# Patient Record
Sex: MALE | Race: WHITE | NOT HISPANIC OR LATINO | Employment: FULL TIME | URBAN - NONMETROPOLITAN AREA
[De-identification: names, ages, dates, MRNs, and addresses within clinical notes are randomized per-mention and may not be internally consistent; named-entity substitution may affect disease eponyms.]

---

## 2017-01-19 ENCOUNTER — TELEPHONE (OUTPATIENT)
Dept: CARDIOLOGY | Facility: CLINIC | Age: 67
End: 2017-01-19

## 2017-01-19 NOTE — TELEPHONE ENCOUNTER
----- Message from Shara Laurent sent at 1/19/2017  4:08 PM EST -----  PATIENT HAS BEEN MOVED TO BE SEEN BY DR. NARAYAN IN THE [facility] BUT WONDERED WHEN THE DATE AND TIME OF HIS NEXT PACEMAKER CHECK IS SCHEDULED.      Returned call to patient, left message I was returning call.

## 2017-01-24 ENCOUNTER — TELEPHONE (OUTPATIENT)
Dept: CARDIOLOGY | Facility: CLINIC | Age: 67
End: 2017-01-24

## 2017-01-24 NOTE — TELEPHONE ENCOUNTER
"----- Message from Shara Laurent sent at 1/19/2017  4:08 PM EST -----  PATIENT HAS BEEN MOVED TO BE SEEN BY DR. NARAYAN IN THE [facility] BUT WONDERED WHEN THE DATE AND TIME OF HIS NEXT PACEMAKER CHECK IS SCHEDULED.      Called patient home and spoke with spouse, \"Beatris\" on 01/19/2017 and gave an interrogation of pacer date of 02/09/2017 @ 10:10a.m.  "

## 2017-02-09 ENCOUNTER — CLINICAL SUPPORT (OUTPATIENT)
Dept: CARDIOLOGY | Facility: CLINIC | Age: 67
End: 2017-02-09

## 2017-02-09 DIAGNOSIS — I49.5 SSS (SICK SINUS SYNDROME) (HCC): Primary | ICD-10-CM

## 2017-02-09 PROCEDURE — 93288 INTERROG EVL PM/LDLS PM IP: CPT | Performed by: INTERNAL MEDICINE

## 2017-02-23 ENCOUNTER — OFFICE VISIT (OUTPATIENT)
Dept: CARDIOLOGY | Facility: CLINIC | Age: 67
End: 2017-02-23

## 2017-02-23 VITALS
BODY MASS INDEX: 25.55 KG/M2 | RESPIRATION RATE: 14 BRPM | SYSTOLIC BLOOD PRESSURE: 116 MMHG | DIASTOLIC BLOOD PRESSURE: 71 MMHG | WEIGHT: 173 LBS | HEART RATE: 64 BPM

## 2017-02-23 DIAGNOSIS — R00.0 TACHYCARDIA: ICD-10-CM

## 2017-02-23 DIAGNOSIS — I49.5 SSS (SICK SINUS SYNDROME) (HCC): Primary | ICD-10-CM

## 2017-02-23 DIAGNOSIS — E78.5 DYSLIPIDEMIA: ICD-10-CM

## 2017-02-23 PROCEDURE — 99214 OFFICE O/P EST MOD 30 MIN: CPT | Performed by: PHYSICIAN ASSISTANT

## 2017-02-23 PROCEDURE — 93000 ELECTROCARDIOGRAM COMPLETE: CPT | Performed by: PHYSICIAN ASSISTANT

## 2017-02-23 RX ORDER — PRAVASTATIN SODIUM 40 MG
40 TABLET ORAL DAILY
Qty: 90 TABLET | Refills: 1 | Status: SHIPPED | OUTPATIENT
Start: 2017-02-23 | End: 2017-06-12 | Stop reason: SDUPTHER

## 2017-02-23 NOTE — PROGRESS NOTES
No Known Provider  Marvin Mendoza  1950 02/23/2017    Patient Active Problem List   Diagnosis   • SSS (sick sinus syndrome), s/p pacemaker placement 8/2009.   • Dyslipidemia       Dear No Known Provider:    Chief Complaint   Patient presents with   • Follow-up   • Rapid Heart Rate       Subjective     Marvin Mendoza is a 66 y.o. male with a past medical history significant for sick sinus syndrome status post permanent pacemaker implantation originally in August 2009 with a [employer] medical device.  He had a generator change on 3/17/2015.  He presents to the office today for follow-up visit.  He recently had his pacemaker interrogated which revealed 10.3 years of life left.  It also revealed high ventricular rates on multiple multiple occasions with no ventricular tachycardia or ventricular fibrillation appreciated. The patient denies any palpitations, dizziness, or near syncope. He cannot tell when his heart rate is up. He has been doing well. He denies any chest pains or shortness of breath. He does occasionally have some left shoulder pains. He works at [employer] and does a lot of heavy lifting. Denies any shoulder pain with exertion, while working, walking up stairs, or fast walking. Denies any easy fatigability. He states he is physically able to do the things he wants without limitation. No recent fever or chills. Overall he is doing well.     He has no known history of coronary artery disease, family history of premature coronary disease, and only smoked briefly as a teen. No diabetes or hypertension.       Current Outpatient Prescriptions:   •  aspirin 81 MG EC tablet, Take 81 mg by mouth daily., Disp: , Rfl:   •  pravastatin (PRAVACHOL) 40 MG tablet, Take 1 tablet by mouth Daily., Disp: 90 tablet, Rfl: 1  •  sildenafil (VIAGRA) 100 MG tablet, Take 100 mg by mouth as needed for erectile dysfunction., Disp: , Rfl:   •  metoprolol tartrate (LOPRESSOR) 25 MG tablet, Take 0.5 tablets by mouth 2 (Two)  Times a Day., Disp: 30 tablet, Rfl: 2    The following portions of the patient's history were reviewed and updated as appropriate: allergies, current medications, past family history, past medical history, past social history, past surgical history and problem list.    ROS  Review of Systems   Constitution: Negative for chills and fever.   Cardiovascular: Negative for leg swelling, near-syncope, orthopnea and syncope. Chest pain: Denies.  Respiratory: Negative for shortness of breath.   Hematologic/Lymphatic: Negative for bleeding problem.   Neurological: Dizziness: every now and then.     Objective   Blood pressure 116/71, pulse 64, resp. rate 14, weight 173 lb (78.5 kg).     Physical Exam   Constitutional: He is oriented to person, place, and time. He appears well-developed and well-nourished. No distress.   HENT:   Head: Normocephalic and atraumatic.   Eyes: Conjunctivae are normal. Right eye exhibits no discharge. Left eye exhibits no discharge.   Neck: Normal range of motion. Neck supple. Carotid bruit is not present.   Cardiovascular: Normal rate, regular rhythm and normal heart sounds.  Exam reveals no gallop and no friction rub.    No murmur heard.  Pulmonary/Chest: Effort normal and breath sounds normal. No respiratory distress. He has no wheezes. He has no rales. He exhibits no tenderness.   Musculoskeletal: Normal range of motion. He exhibits no edema.   Neurological: He is alert and oriented to person, place, and time.   Skin: Skin is warm and dry. No rash noted. He is not diaphoretic. No erythema. No pallor.   Psychiatric: He has a normal mood and affect. His behavior is normal.   Nursing note and vitals reviewed.        ECG 12 Lead  Date/Time: 2/23/2017 11:23 AM  Performed by: ANJELICA DURAN  Authorized by: ANJELICA DURAN   Comparison: compared with previous ECG   Similar to previous ECG  Rhythm: sinus rhythm  Rate: normal  BPM: 64  Conduction: 1st degree  QRS axis: normal  Clinical  impression: non-specific ECG  Comments: Nonspecific St/T changes in III and AVF with T wave inversion and flattening. With magnet, ventricular paced rhythm.             Assessment:          Diagnosis Plan   1. SSS (sick sinus syndrome), s/p pacemaker placement 8/2009.  Adult Transthoracic Echo Complete   2. Dyslipidemia      On pravastatin   3. Tachycardia  TSH    CBC & Differential    Comprehensive Metabolic Panel    Adult Transthoracic Echo Complete    With high rates noted on recent pacemaker interrogation. Asymptomatic.           Plan:       1. Will try a low dose metoprolol 12.5mg BID.  2. I sent in refills on his pravastatin. Continue low dose aspirin.   3. Check CBC, CMP, TSH.    4. Will evaluate further with a transthoracic echocardiogram to evaluate cardiac wall motion and left ventricular systolic function as he has not had an echo since 2009.   5. Encouraged to stay well hydrated and to avoid caffeine.   6. [facility] [place] [place] and adjust dose of statin if needed.   7. Explained to the patient and his wife that if his left shoulder pain starts happening with exertion or activity, or if he develops chest pains or shortness of breath, or easy fatigability to be sure and let us know.   8. Follow up in 3-4 months of sooner if needed.      Return in about 3 months (around 5/23/2017).    I appreciate the opportunity to participate in this patient's cardiovascular care.    Best Regards,    Tereza Mann PA-C

## 2017-02-23 NOTE — PROGRESS NOTES
No Known Provider  Marvin Mendoza  1950 02/23/2017    Patient Active Problem List   Diagnosis   • SSS (sick sinus syndrome), s/p pacemaker placement 8/2009.   • Dyslipidemia       Dear No Known Provider:    Subjective     Marvin Mendoza is a 66 y.o. male with the problems as listed above, presents      History of Present Illness:        No Known Allergies:      Current Outpatient Prescriptions:   •  aspirin 81 MG EC tablet, Take 81 mg by mouth daily., Disp: , Rfl:   •  pravastatin (PRAVACHOL) 40 MG tablet, Take 1 tablet by mouth daily., Disp: 90 tablet, Rfl: 1  •  sildenafil (VIAGRA) 100 MG tablet, Take 100 mg by mouth as needed for erectile dysfunction., Disp: , Rfl:       The following portions of the patient's history were reviewed and updated as appropriate: allergies, current medications, past family history, past medical history, past social history, past surgical history and problem list.    Social History   Substance Use Topics   • Smoking status: Never Smoker   • Smokeless tobacco: Never Used   • Alcohol use Yes      Comment: hx socially,occasionally       Review of Systems   Constitution: Negative for chills and fever.   Cardiovascular: Negative for leg swelling, near-syncope, orthopnea and syncope. Chest pain: off and on.   Respiratory: Negative for shortness of breath.    Hematologic/Lymphatic: Negative for bleeding problem.   Neurological: Dizziness: every now and then.       Objective   There were no vitals filed for this visit.  There is no height or weight on file to calculate BMI.        Physical Exam    Lab Results   Component Value Date     06/21/2016    K 4.3 06/21/2016     06/21/2016    CO2 31.0 06/21/2016    BUN 21 06/21/2016    CREATININE 0.92 06/21/2016    GLUCOSE 89 06/21/2016    CALCIUM 9.4 06/21/2016    AST 32 09/08/2015    ALT 40 09/08/2015    ALKPHOS 96 09/08/2015    LABIL2 1.4 (L) 09/08/2015     No results found for: CKTOTAL  Lab Results   Component Value Date    WBC 5.0  03/17/2015    HGB 14.1 03/17/2015    HCT 42.4 03/17/2015     03/17/2015     Lab Results   Component Value Date    INR 0.96 03/17/2015     No results found for: MG  Lab Results   Component Value Date    CHLPL 204 (H) 09/08/2015    TRIG 30 06/21/2016    HDL 65 06/21/2016     (H) 09/08/2015      No results found for: BNP  Echo   No results found for: ECHOEFEST  Procedures    Assessment/Plan   No diagnosis found.             Recommendations:       No Follow-up on file.    As always, I appreciate very much the opportunity to participate in the cardiovascular care of your patients.      With Best Regards,    Joseph Avina MD, FACC    Scribed for Joseph Avina MD by [unfilled]. 2/23/2017  10:06 AM

## 2017-03-02 ENCOUNTER — TELEPHONE (OUTPATIENT)
Dept: CARDIOLOGY | Facility: CLINIC | Age: 67
End: 2017-03-02

## 2017-03-02 NOTE — TELEPHONE ENCOUNTER
[facility] and [facility] stated to [facility] a fax cover sheet stating what we was needing. Fax number is [phone]. Faxed them a request.     ----- Message from ALEX Valverde sent at 2/23/2017 11:22 AM EST -----  Please get most [facility].

## 2017-03-06 ENCOUNTER — HOSPITAL ENCOUNTER (OUTPATIENT)
Dept: CARDIOLOGY | Facility: HOSPITAL | Age: 67
Discharge: HOME | End: 2017-03-06
Admitting: PHYSICIAN ASSISTANT

## 2017-03-06 DIAGNOSIS — I49.5 SSS (SICK SINUS SYNDROME) (HCC): ICD-10-CM

## 2017-03-06 DIAGNOSIS — R00.0 TACHYCARDIA: ICD-10-CM

## 2017-03-06 PROCEDURE — 93306 TTE W/DOPPLER COMPLETE: CPT | Performed by: INTERNAL MEDICINE

## 2017-03-06 PROCEDURE — 93306 TTE W/DOPPLER COMPLETE: CPT

## 2017-03-10 LAB
BH CV ECHO MEAS - % IVS THICK: 27.2 %
BH CV ECHO MEAS - % LVPW THICK: 29.8 %
BH CV ECHO MEAS - ACS: 2.3 CM
BH CV ECHO MEAS - AO ROOT AREA (BSA CORRECTED): 1.4
BH CV ECHO MEAS - AO ROOT AREA: 5.9 CM^2
BH CV ECHO MEAS - AO ROOT DIAM: 2.7 CM
BH CV ECHO MEAS - BSA(HAYCOCK): 2 M^2
BH CV ECHO MEAS - BSA: 1.9 M^2
BH CV ECHO MEAS - BZI_BMI: 25.5 KILOGRAMS/M^2
BH CV ECHO MEAS - BZI_METRIC_HEIGHT: 175.3 CM
BH CV ECHO MEAS - BZI_METRIC_WEIGHT: 78.5 KG
BH CV ECHO MEAS - CONTRAST EF 4CH: 69.7 ML/M^2
BH CV ECHO MEAS - EDV(CUBED): 123.7 ML
BH CV ECHO MEAS - EDV(MOD-SP4): 66 ML
BH CV ECHO MEAS - EDV(TEICH): 117.3 ML
BH CV ECHO MEAS - EF(CUBED): 64.4 %
BH CV ECHO MEAS - EF(MOD-SP4): 69.7 %
BH CV ECHO MEAS - EF(TEICH): 55.7 %
BH CV ECHO MEAS - ESV(CUBED): 44.1 ML
BH CV ECHO MEAS - ESV(MOD-SP4): 20 ML
BH CV ECHO MEAS - ESV(TEICH): 52 ML
BH CV ECHO MEAS - FS: 29.1 %
BH CV ECHO MEAS - IVS/LVPW: 0.89
BH CV ECHO MEAS - IVSD: 0.95 CM
BH CV ECHO MEAS - IVSS: 1.2 CM
BH CV ECHO MEAS - LA DIMENSION: 2.7 CM
BH CV ECHO MEAS - LA/AO: 0.98
BH CV ECHO MEAS - LV DIASTOLIC VOL/BSA (35-75): 34 ML/M^2
BH CV ECHO MEAS - LV MASS(C)D: 183.9 GRAMS
BH CV ECHO MEAS - LV MASS(C)DI: 94.7 GRAMS/M^2
BH CV ECHO MEAS - LV MASS(C)S: 156.1 GRAMS
BH CV ECHO MEAS - LV MASS(C)SI: 80.4 GRAMS/M^2
BH CV ECHO MEAS - LV SYSTOLIC VOL/BSA (12-30): 10.3 ML/M^2
BH CV ECHO MEAS - LVIDD: 5 CM
BH CV ECHO MEAS - LVIDS: 3.5 CM
BH CV ECHO MEAS - LVLD AP4: 7.5 CM
BH CV ECHO MEAS - LVLS AP4: 6.2 CM
BH CV ECHO MEAS - LVOT AREA (M): 3.1 CM^2
BH CV ECHO MEAS - LVOT AREA: 3.1 CM^2
BH CV ECHO MEAS - LVOT DIAM: 2 CM
BH CV ECHO MEAS - LVPWD: 1.1 CM
BH CV ECHO MEAS - LVPWS: 1.4 CM
BH CV ECHO MEAS - MV A MAX VEL: 78.5 CM/SEC
BH CV ECHO MEAS - MV E MAX VEL: 88.4 CM/SEC
BH CV ECHO MEAS - MV E/A: 1.1
BH CV ECHO MEAS - PA ACC SLOPE: 977.5 CM/SEC^2
BH CV ECHO MEAS - PA ACC TIME: 0.14 SEC
BH CV ECHO MEAS - PA PR(ACCEL): 17.2 MMHG
BH CV ECHO MEAS - RAP SYSTOLE: 10 MMHG
BH CV ECHO MEAS - RVSP: 28.5 MMHG
BH CV ECHO MEAS - SI(CUBED): 41 ML/M^2
BH CV ECHO MEAS - SI(MOD-SP4): 23.7 ML/M^2
BH CV ECHO MEAS - SI(TEICH): 33.6 ML/M^2
BH CV ECHO MEAS - SV(CUBED): 79.6 ML
BH CV ECHO MEAS - SV(MOD-SP4): 46 ML
BH CV ECHO MEAS - SV(TEICH): 65.3 ML
BH CV ECHO MEAS - TR MAX VEL: 215.3 CM/SEC

## 2017-06-12 ENCOUNTER — OFFICE VISIT (OUTPATIENT)
Dept: CARDIOLOGY | Facility: CLINIC | Age: 67
End: 2017-06-12

## 2017-06-12 VITALS
HEART RATE: 65 BPM | BODY MASS INDEX: 25.48 KG/M2 | DIASTOLIC BLOOD PRESSURE: 72 MMHG | SYSTOLIC BLOOD PRESSURE: 113 MMHG | WEIGHT: 172 LBS | HEIGHT: 69 IN

## 2017-06-12 DIAGNOSIS — E78.5 DYSLIPIDEMIA: ICD-10-CM

## 2017-06-12 DIAGNOSIS — I49.5 SSS (SICK SINUS SYNDROME) (HCC): Primary | ICD-10-CM

## 2017-06-12 PROCEDURE — 99213 OFFICE O/P EST LOW 20 MIN: CPT | Performed by: INTERNAL MEDICINE

## 2017-06-12 PROCEDURE — 93000 ELECTROCARDIOGRAM COMPLETE: CPT | Performed by: INTERNAL MEDICINE

## 2017-06-12 RX ORDER — PRAVASTATIN SODIUM 40 MG
40 TABLET ORAL DAILY
Qty: 90 TABLET | Refills: 2 | Status: SHIPPED | OUTPATIENT
Start: 2017-06-12 | End: 2018-02-16 | Stop reason: SDUPTHER

## 2017-06-12 NOTE — PROGRESS NOTES
No Known Provider  Marvin Simon  1950 06/12/2017    Patient Active Problem List   Diagnosis   • SSS (sick sinus syndrome), s/p pacemaker placement 8/2009.   • Dyslipidemia       Dear No Known Provider:    Subjective     Marvin Simon is a 66 y.o. male with the problems as listed above, presents      History of Present Illness:Mr. simon is a 65-year-old  male with history of sick sinus syndrome, status post permanent pacemaker implantation August 2009, is in for a cardiology follow-up.  Denies any complaints of palpitations, dizziness or syncope.  No complaints of chest pains or shortness of breath.        No Known Allergies:      Current Outpatient Prescriptions:   •  aspirin 81 MG EC tablet, Take 81 mg by mouth daily., Disp: , Rfl:   •  metoprolol tartrate (LOPRESSOR) 25 MG tablet, Take 0.5 tablets by mouth 2 (Two) Times a Day for 90 days., Disp: 90 tablet, Rfl: 2  •  pravastatin (PRAVACHOL) 40 MG tablet, Take 1 tablet by mouth Daily., Disp: 90 tablet, Rfl: 2  •  sildenafil (VIAGRA) 100 MG tablet, Take 100 mg by mouth as needed for erectile dysfunction., Disp: , Rfl:       The following portions of the patient's history were reviewed and updated as appropriate: allergies, current medications, past family history, past medical history, past social history, past surgical history and problem list.    Social History   Substance Use Topics   • Smoking status: Never Smoker   • Smokeless tobacco: Never Used   • Alcohol use Yes      Comment: hx socially,occasionally       Review of Systems   Constitution: Negative for chills and fever.   HENT: Negative for headaches, nosebleeds and sore throat.    Respiratory: Negative for cough, hemoptysis and wheezing.    Gastrointestinal: Negative for abdominal pain, hematemesis, hematochezia, melena, nausea and vomiting.   Genitourinary: Negative for dysuria and hematuria.   Neurological: Positive for dizziness.       Objective   Vitals:    06/12/17 0801   BP: 113/72   BP  "Location: Left arm   Patient Position: Sitting   Cuff Size: Adult   Pulse: 65   Weight: 172 lb (78 kg)   Height: 69\" (175.3 cm)     Body mass index is 25.4 kg/(m^2).        Physical Exam   Constitutional: He is oriented to person, place, and time. He appears well-developed and well-nourished.   HENT:   Head: Normocephalic.   Eyes: Conjunctivae and EOM are normal.   Neck: Normal range of motion. Neck supple. No JVD present. No tracheal deviation present. No thyromegaly present.   Cardiovascular: Normal rate and regular rhythm.  Exam reveals no gallop and no friction rub.    No murmur heard.  Pulmonary/Chest: Breath sounds normal. No respiratory distress. He has no wheezes. He has no rales.   Abdominal: Soft. Bowel sounds are normal. He exhibits no mass. There is no tenderness.   Musculoskeletal: He exhibits no edema.   Neurological: He is alert and oriented to person, place, and time. No cranial nerve deficit.   Skin: Skin is warm and dry.   Psychiatric: He has a normal mood and affect.       Lab Results   Component Value Date     06/21/2016    K 4.3 06/21/2016     06/21/2016    CO2 31.0 06/21/2016    BUN 21 06/21/2016    CREATININE 0.92 06/21/2016    GLUCOSE 89 06/21/2016    CALCIUM 9.4 06/21/2016    AST 32 09/08/2015    ALT 40 09/08/2015    ALKPHOS 96 09/08/2015    LABIL2 1.4 (L) 09/08/2015     No results found for: CKTOTAL  Lab Results   Component Value Date    WBC 5.0 03/17/2015    HGB 14.1 03/17/2015    HCT 42.4 03/17/2015     03/17/2015     Lab Results   Component Value Date    INR 0.96 03/17/2015     No results found for: MG  Lab Results   Component Value Date    CHLPL 204 (H) 09/08/2015    TRIG 30 06/21/2016    HDL 65 06/21/2016     (H) 09/08/2015      No results found for: BNP  Echo   No results found for: ECHOEFEST    ECG 12 Lead  Date/Time: 6/12/2017 7:58 AM  Performed by: OWEN MONTE  Authorized by: OWEN MONTE   Comments: Appropriate AV sequential " pacing.            Assessment/Plan    Diagnosis Plan   1. SSS (sick sinus syndrome), s/p pacemaker placement 8/2009.     2. Dyslipidemia            Recommendations:  1. Continue with current medications  2. We'll check fasting lipid panel and CMP  3. Follow-up 6 months.    Return in about 6 months (around 12/12/2017).    As always, I appreciate very much the opportunity to participate in the cardiovascular care of your patients.      With Best Regards,    Joseph Avina MD, FACC    Dragon disclaimer:  Much of this encounter note is an electronic transcription/translation of spoken language to printed text. The electronic translation of spoken language may permit erroneous, or at times, nonsensical words or phrases to be inadvertently transcribed; Although I have reviewed the note for such errors, some may still exist.

## 2017-09-13 ENCOUNTER — TELEPHONE (OUTPATIENT)
Dept: CARDIOLOGY | Facility: CLINIC | Age: 67
End: 2017-09-13

## 2017-09-13 NOTE — TELEPHONE ENCOUNTER
Called to advise him that  wanted him to come into the office to be seen due a pacemaker reading. No answer left message.   Called pt and his wife stated that he would need an morning apt due to him working of the evening.  Viviane is going to open up a spot for him on 10.9.17 at 8:20 am.   Called pt to see if that would be okay. No answer left message.

## 2017-09-26 NOTE — TELEPHONE ENCOUNTER
Called pt's wife and advised her that we put him on the schedule for 8:20 am on 10.9.17. She expressed understanding.

## 2017-10-09 ENCOUNTER — OFFICE VISIT (OUTPATIENT)
Dept: CARDIOLOGY | Facility: CLINIC | Age: 67
End: 2017-10-09

## 2017-10-09 VITALS
BODY MASS INDEX: 25.51 KG/M2 | DIASTOLIC BLOOD PRESSURE: 65 MMHG | WEIGHT: 172.2 LBS | SYSTOLIC BLOOD PRESSURE: 103 MMHG | HEART RATE: 70 BPM | HEIGHT: 69 IN

## 2017-10-09 DIAGNOSIS — I49.5 SSS (SICK SINUS SYNDROME) (HCC): Primary | ICD-10-CM

## 2017-10-09 DIAGNOSIS — E78.5 DYSLIPIDEMIA: ICD-10-CM

## 2017-10-09 DIAGNOSIS — I47.1 PAROXYSMAL SVT (SUPRAVENTRICULAR TACHYCARDIA) (HCC): ICD-10-CM

## 2017-10-09 PROBLEM — I47.10 PAROXYSMAL SVT (SUPRAVENTRICULAR TACHYCARDIA): Status: ACTIVE | Noted: 2017-10-09

## 2017-10-09 PROCEDURE — 99214 OFFICE O/P EST MOD 30 MIN: CPT | Performed by: INTERNAL MEDICINE

## 2017-10-09 PROCEDURE — 93000 ELECTROCARDIOGRAM COMPLETE: CPT | Performed by: INTERNAL MEDICINE

## 2017-10-09 RX ORDER — FLECAINIDE ACETATE 50 MG/1
50 TABLET ORAL 2 TIMES DAILY
Qty: 60 TABLET | Refills: 5 | Status: SHIPPED | OUTPATIENT
Start: 2017-10-09 | End: 2018-02-16 | Stop reason: SDUPTHER

## 2017-10-09 NOTE — PROGRESS NOTES
No Known Provider  Marvin Simon  1950  10/09/2017    Patient Active Problem List   Diagnosis   • SSS (sick sinus syndrome), s/p pacemaker placement 8/2009.   • Dyslipidemia   • Paroxysmal SVT (supraventricular tachycardia)           Subjective     Marvin Simon is a 67 y.o. male with the problems as listed above, presents for follow-up of recent pacemaker interrogation.      History of Present Illness:The cheek is a pleasant 67-year-old  male with a history of sick sinus syndrome for which she has had permanent dual-chamber pacemaker implanted with a [employer] medical device.  On recent evaluation, he was noted to have some episodes of rapid ventricular rate with possible runs of SVT.  Hence he  is brought in today for further review and evaluation.  On further questioning Mr. simon denies any complaints of palpitations, dizziness or syncope.  Overall he's been doing well with no specific complaints.His recent CMP on October 3, 2017 revealed a potassium of 4.3 sodium 138 BUN of 16 creatinine of 0.88.  His CBC revealed white count of 4600 Hemoglobin of 14 and hematocrit 40.7 platelet count of 27,000.  AST and ALT were normal at 34 and 48 respectively.  His fasting lipid panel revealed total cholesterol 153 LDL of 83 and HDL of 62    No Known Allergies:      Current Outpatient Prescriptions:   •  aspirin 81 MG EC tablet, Take 81 mg by mouth daily., Disp: , Rfl:   •  metoprolol tartrate (LOPRESSOR) 25 MG tablet, Take 25 mg by mouth 2 (Two) Times a Day., Disp: , Rfl:   •  pravastatin (PRAVACHOL) 40 MG tablet, Take 1 tablet by mouth Daily., Disp: 90 tablet, Rfl: 2  •  sildenafil (VIAGRA) 100 MG tablet, Take 100 mg by mouth as needed for erectile dysfunction., Disp: , Rfl:   •  flecainide (TAMBOCOR) 50 MG tablet, Take 1 tablet by mouth 2 (Two) Times a Day., Disp: 60 tablet, Rfl: 5      The following portions of the patient's history were reviewed and updated as appropriate: allergies, current medications,  "past family history, past medical history, past social history, past surgical history and problem list.    Social History   Substance Use Topics   • Smoking status: Never Smoker   • Smokeless tobacco: Never Used   • Alcohol use Yes      Comment: hx socially,occasionally       Review of Systems   Constitution: Negative for chills and fever.   HENT: Negative for nosebleeds and sore throat.    Cardiovascular: Negative for chest pain, leg swelling and palpitations.   Respiratory: Negative for cough, hemoptysis, shortness of breath and wheezing.    Gastrointestinal: Negative for abdominal pain, hematemesis, hematochezia, melena, nausea and vomiting.   Genitourinary: Negative for dysuria and hematuria.   Neurological: Negative for dizziness and headaches.       Objective   Vitals:    10/09/17 0815   BP: 103/65   BP Location: Left arm   Patient Position: Sitting   Cuff Size: Adult   Pulse: 70   Weight: 172 lb 3.2 oz (78.1 kg)   Height: 69\" (175.3 cm)     Body mass index is 25.43 kg/(m^2).        Physical Exam   Constitutional: He is oriented to person, place, and time. He appears well-developed and well-nourished.   HENT:   Head: Normocephalic.   Eyes: Conjunctivae and EOM are normal.   Neck: Normal range of motion. Neck supple. No JVD present. No tracheal deviation present. No thyromegaly present.   Cardiovascular: Normal rate and regular rhythm.  Exam reveals no gallop and no friction rub.    No murmur heard.  Pulmonary/Chest: Breath sounds normal. No respiratory distress. He has no wheezes. He has no rales.   Abdominal: Soft. Bowel sounds are normal. He exhibits no mass. There is no tenderness.   Musculoskeletal: He exhibits no edema.   Neurological: He is alert and oriented to person, place, and time. No cranial nerve deficit.   Skin: Skin is warm and dry.   Psychiatric: He has a normal mood and affect.       Lab Results   Component Value Date     06/21/2016    K 4.3 06/21/2016     06/21/2016    CO2 31.0 " 2016    BUN 21 2016    CREATININE 0.92 2016    GLUCOSE 89 2016    CALCIUM 9.4 2016    AST 32 2015    ALT 40 2015    ALKPHOS 96 2015    LABIL2 1.4 (L) 2015     No results found for: CKTOTAL  Lab Results   Component Value Date    WBC 5.0 2015    HGB 14.1 2015    HCT 42.4 2015     2015     Lab Results   Component Value Date    INR 0.96 2015     No results found for: MG  Lab Results   Component Value Date    CHLPL 204 (H) 2015    TRIG 30 2016    HDL 65 2016     (H) 2015      No results found for: BNP  Echo   Marvin Mendoza   Echocardiogram   Order# [id number]   Reading physician: Joseph Avina MD Ordering physician:   ALEX Valverde Study date: 3/6/17   Patient Information   Patient Name MRN Sex  (Age)   Marvin Mendoza [id number] Male 1950 (67 y.o.)   Sedation Narrator Report   Sedation Narrator Report   Interpretation Summary   · Normal left ventricular cavity size and wall thickness noted. All left ventricular wall segments contract normally.  · Estimated EF appears to be in the range of 61 - 65%.  · The aortic valve is structurally normal. No aortic valve regurgitation is present. No aortic valve stenosis is present  · The mitral valve is normal in structure. No mitral valve regurgitation is present. No significant mitral valve stenosis is present.  · The tricuspid valve is normal. No tricuspid valve stenosis is present. No tricuspid valve regurgitation is present. Estimated right ventricular systolic pressure from tricuspid regurgitation is normal (<35 mmHg).  · There is no evidence of pericardial effusion         ECG 12 Lead  Date/Time: 10/9/2017 8:12 AM  Performed by: JOSEPH AVINA  Authorized by: JOSEPH AVINA   Comments: 100% appropriate AV sequential pacing.            Assessment/Plan      1. SSS (sick sinus syndrome), s/p pacemaker placement 2009, Clinically stable.      2. Dyslipidemia     3. Paroxysmal SVT (supraventricular tachycardia) noted on recent pacemaker interrogation.         Recommendations:    1. Continue with metoprolol as tolerated.  2. We'll add flecainide to help with this paroxysmal SVT (patient has no known coronary artery disease or structural heart disease)  3. We will reevaluate after he has been on flecainide for some time.  4. Follow-up in 4-5 months.     Return in about 4 months (around 2/9/2018).    As always, I appreciate very much the opportunity to participate in the cardiovascular care of your patients.      With Best Regards,    Joseph Avina MD, [facility]    Dragon disclaimer:  Much of this encounter note is an electronic transcription/translation of spoken language to printed text. The electronic translation of spoken language may permit erroneous, or at times, nonsensical words or phrases to be inadvertently transcribed; Although I have reviewed the note for such errors, some may still exist.

## 2017-10-12 ENCOUNTER — CLINICAL SUPPORT (OUTPATIENT)
Dept: CARDIOLOGY | Facility: CLINIC | Age: 67
End: 2017-10-12

## 2018-01-26 ENCOUNTER — TREATMENT (OUTPATIENT)
Dept: CARDIOLOGY | Facility: CLINIC | Age: 68
End: 2018-01-26

## 2018-01-26 DIAGNOSIS — I49.5 SSS (SICK SINUS SYNDROME) (HCC): Primary | ICD-10-CM

## 2018-01-26 PROCEDURE — 93296 REM INTERROG EVL PM/IDS: CPT | Performed by: INTERNAL MEDICINE

## 2018-01-26 PROCEDURE — 93294 REM INTERROG EVL PM/LDLS PM: CPT | Performed by: INTERNAL MEDICINE

## 2018-02-16 ENCOUNTER — OFFICE VISIT (OUTPATIENT)
Dept: CARDIOLOGY | Facility: CLINIC | Age: 68
End: 2018-02-16

## 2018-02-16 VITALS
WEIGHT: 172.4 LBS | HEART RATE: 67 BPM | HEIGHT: 69 IN | SYSTOLIC BLOOD PRESSURE: 111 MMHG | DIASTOLIC BLOOD PRESSURE: 72 MMHG | RESPIRATION RATE: 16 BRPM | BODY MASS INDEX: 25.53 KG/M2

## 2018-02-16 DIAGNOSIS — I49.5 SSS (SICK SINUS SYNDROME) (HCC): ICD-10-CM

## 2018-02-16 DIAGNOSIS — E78.5 DYSLIPIDEMIA: ICD-10-CM

## 2018-02-16 DIAGNOSIS — I47.1 PAROXYSMAL SVT (SUPRAVENTRICULAR TACHYCARDIA) (HCC): Primary | ICD-10-CM

## 2018-02-16 PROCEDURE — 99213 OFFICE O/P EST LOW 20 MIN: CPT | Performed by: PHYSICIAN ASSISTANT

## 2018-02-16 RX ORDER — PRAVASTATIN SODIUM 40 MG
40 TABLET ORAL DAILY
Qty: 90 TABLET | Refills: 1 | Status: SHIPPED | OUTPATIENT
Start: 2018-02-16 | End: 2018-07-23 | Stop reason: SDUPTHER

## 2018-02-16 RX ORDER — FLECAINIDE ACETATE 100 MG/1
100 TABLET ORAL EVERY 12 HOURS SCHEDULED
Qty: 180 TABLET | Refills: 1 | Status: SHIPPED | OUTPATIENT
Start: 2018-02-16 | End: 2018-07-23 | Stop reason: SDUPTHER

## 2018-02-16 NOTE — PROGRESS NOTES
No Known Provider  Marvin Mendoza  1950 02/16/2018    Patient Active Problem List   Diagnosis   • SSS (sick sinus syndrome), s/p pacemaker placement 8/2009.   • Dyslipidemia   • Paroxysmal SVT (supraventricular tachycardia)     Dear No Known Provider:    Chief Complaint   Patient presents with   • SSS s/p PPI     abn pacer reading on Merlin reading in Epic.      Subjective     Marvin Mendoza is a 67 y.o. male with a past medical history significant for Sick sinus syndrome status post prior pacemaker implantation in August 2009, paroxysmal supraventricular tachycardia noted on pacemaker interrogations, and dyslipidemia.  He was previously initiated on flecainide at 50 mg twice daily for SVT noted on pacemaker interrogation.  Most recent interrogation on 1/23/18 did reveal continued episodes of high ventricular rates, likely SVT, but the amount of episodes has significantly decreased since starting flecainide.  He does report intermittent dizziness with no associated shortness of breath, palpitations, or syncopal episodes.      Current Outpatient Prescriptions:   •  aspirin 81 MG EC tablet, Take 81 mg by mouth daily., Disp: , Rfl:   •  flecainide (TAMBOCOR) 100 MG tablet, Take 1 tablet by mouth Every 12 (Twelve) Hours., Disp: 180 tablet, Rfl: 1  •  metoprolol tartrate (LOPRESSOR) 25 MG tablet, Take 0.5 tablets by mouth Every 12 (Twelve) Hours., Disp: 90 tablet, Rfl: 1  •  pravastatin (PRAVACHOL) 40 MG tablet, Take 1 tablet by mouth Daily., Disp: 90 tablet, Rfl: 1  •  sildenafil (VIAGRA) 100 MG tablet, Take 100 mg by mouth as needed for erectile dysfunction., Disp: , Rfl:     The following portions of the patient's history were reviewed and updated as appropriate: allergies, current medications, past family history, past medical history, past social history, past surgical history and problem list.    Social History     Social History   • Marital status: Legally      Spouse name: N/A   • Number of children:  "N/A   • Years of education: N/A     Occupational History   • Not on file.     Social History Main Topics   • Smoking status: Never Smoker   • Smokeless tobacco: Never Used   • Alcohol use Yes      Comment: hx socially,occasionally   • Drug use: No   • Sexual activity: Not on file     Other Topics Concern   • Not on file     Social History Narrative     Review of Systems   Constitution: Negative for chills and fever.   HENT: Negative for nosebleeds and sore throat.    Cardiovascular: Negative for chest pain, leg swelling, palpitations and syncope.   Respiratory: Negative for cough, hemoptysis, shortness of breath and wheezing.    Gastrointestinal: Negative for abdominal pain, hematemesis, hematochezia, melena, nausea and vomiting.   Genitourinary: Negative for dysuria and hematuria.   Neurological: Positive for dizziness. Negative for headaches.     Objective   Blood pressure 111/72, pulse 67, resp. rate 16, height 175.3 cm (69.02\"), weight 78.2 kg (172 lb 6.4 oz).  Body mass index is 25.45 kg/(m^2).    Physical Exam   Constitutional: He is oriented to person, place, and time. He appears well-developed and well-nourished. No distress.   HENT:   Head: Normocephalic and atraumatic.   Hard of hearing.    Eyes: Conjunctivae are normal. Right eye exhibits no discharge. Left eye exhibits no discharge.   Neck: Normal range of motion. Neck supple. Carotid bruit is not present.   Cardiovascular: Normal rate, regular rhythm and normal heart sounds.  Exam reveals no gallop and no friction rub.    No murmur heard.  Pulmonary/Chest: Effort normal and breath sounds normal. No respiratory distress. He has no wheezes. He has no rales. He exhibits no tenderness.   Musculoskeletal: Normal range of motion. He exhibits no edema.   Neurological: He is alert and oriented to person, place, and time.   Skin: Skin is warm and dry. No rash noted. He is not diaphoretic. No erythema. No pallor.   Psychiatric: He has a normal mood and affect. " His behavior is normal.   Nursing note and vitals reviewed.    Procedures  Transthoracic echocardiogram 03/10/17  · Normal left ventricular cavity size and wall thickness noted. All left ventricular wall segments contract normally.  · Estimated EF appears to be in the range of 61 - 65%.  · The aortic valve is structurally normal. No aortic valve regurgitation is present. No aortic valve stenosis is present  · The mitral valve is normal in structure. No mitral valve regurgitation is present. No significant mitral valve stenosis is present.  · The tricuspid valve is normal. No tricuspid valve stenosis is present. No tricuspid valve regurgitation is present. Estimated right ventricular systolic pressure from tricuspid regurgitation is normal (<35 mmHg).  · There is no evidence of pericardial effusion.      Assessment:          Diagnosis Plan   1. Paroxysmal SVT (supraventricular tachycardia)  Basic Metabolic Panel    Magnesium    Adult Transthoracic Echo Complete W/ Cont if Necessary Per Protocol   2. SSS (sick sinus syndrome), s/p pacemaker placement 8/2009.  Basic Metabolic Panel    Magnesium    Adult Transthoracic Echo Complete W/ Cont if Necessary Per Protocol   3. Dyslipidemia          Plan:       1. Repeat BMP, Magnesium.   2. Increase flecainide to 100 mg twice daily.  3. Reevaluate cardiac wall motion and left ventricular systolic function with a transthoracic echocardiogram due to continued intermittent SVT.   4. We'll continue with regular pacemaker checks and follow-up in 2 months or sooner if needed.    No Follow-up on file.    I appreciate the opportunity to participate in this patient's cardiovascular care.    Best Regards,    Tereza Mann PA-C

## 2018-02-27 ENCOUNTER — TREATMENT (OUTPATIENT)
Dept: CARDIOLOGY | Facility: CLINIC | Age: 68
End: 2018-02-27

## 2018-02-27 DIAGNOSIS — I49.5 SSS (SICK SINUS SYNDROME) (HCC): Primary | ICD-10-CM

## 2018-02-28 ENCOUNTER — HOSPITAL ENCOUNTER (OUTPATIENT)
Dept: CARDIOLOGY | Facility: HOSPITAL | Age: 68
Discharge: HOME | End: 2018-02-28
Admitting: PHYSICIAN ASSISTANT

## 2018-02-28 ENCOUNTER — LAB (OUTPATIENT)
Dept: LAB | Facility: HOSPITAL | Age: 68
End: 2018-02-28

## 2018-02-28 DIAGNOSIS — I49.5 SSS (SICK SINUS SYNDROME) (HCC): ICD-10-CM

## 2018-02-28 DIAGNOSIS — I47.1 PAROXYSMAL SVT (SUPRAVENTRICULAR TACHYCARDIA) (HCC): ICD-10-CM

## 2018-02-28 LAB
ANION GAP SERPL CALCULATED.3IONS-SCNC: 3.9 MMOL/L (ref 3.6–11.2)
BUN BLD-MCNC: 14 MG/DL (ref 7–21)
BUN/CREAT SERPL: 15.4 (ref 7–25)
CALCIUM SPEC-SCNC: 8.9 MG/DL (ref 7.7–10)
CHLORIDE SERPL-SCNC: 110 MMOL/L (ref 99–112)
CO2 SERPL-SCNC: 29.1 MMOL/L (ref 24.3–31.9)
CREAT BLD-MCNC: 0.91 MG/DL (ref 0.43–1.29)
GFR SERPL CREATININE-BSD FRML MDRD: 83 ML/MIN/1.73
GLUCOSE BLD-MCNC: 82 MG/DL (ref 70–110)
MAGNESIUM SERPL-MCNC: 2.1 MG/DL (ref 1.7–2.6)
OSMOLALITY SERPL CALC.SUM OF ELEC: 284.5 MOSM/KG (ref 273–305)
POTASSIUM BLD-SCNC: 4.3 MMOL/L (ref 3.5–5.3)
SODIUM BLD-SCNC: 143 MMOL/L (ref 135–153)

## 2018-02-28 PROCEDURE — 36415 COLL VENOUS BLD VENIPUNCTURE: CPT

## 2018-02-28 PROCEDURE — 80048 BASIC METABOLIC PNL TOTAL CA: CPT

## 2018-02-28 PROCEDURE — 93306 TTE W/DOPPLER COMPLETE: CPT

## 2018-02-28 PROCEDURE — 93306 TTE W/DOPPLER COMPLETE: CPT | Performed by: INTERNAL MEDICINE

## 2018-02-28 PROCEDURE — 83735 ASSAY OF MAGNESIUM: CPT

## 2018-03-01 LAB
BH CV ECHO MEAS - % IVS THICK: 22.5 %
BH CV ECHO MEAS - % LVPW THICK: 63.1 %
BH CV ECHO MEAS - ACS: 2.2 CM
BH CV ECHO MEAS - AO MAX PG: 6.8 MMHG
BH CV ECHO MEAS - AO MEAN PG: 3.5 MMHG
BH CV ECHO MEAS - AO ROOT AREA (BSA CORRECTED): 1.6
BH CV ECHO MEAS - AO ROOT AREA: 7.4 CM^2
BH CV ECHO MEAS - AO ROOT DIAM: 3.1 CM
BH CV ECHO MEAS - AO V2 MAX: 130.3 CM/SEC
BH CV ECHO MEAS - AO V2 MEAN: 87.5 CM/SEC
BH CV ECHO MEAS - AO V2 VTI: 27.4 CM
BH CV ECHO MEAS - BSA(HAYCOCK): 2 M^2
BH CV ECHO MEAS - BSA: 1.9 M^2
BH CV ECHO MEAS - BZI_BMI: 25.4 KILOGRAMS/M^2
BH CV ECHO MEAS - BZI_METRIC_HEIGHT: 175.3 CM
BH CV ECHO MEAS - BZI_METRIC_WEIGHT: 78 KG
BH CV ECHO MEAS - CONTRAST EF 4CH: 68.4 ML/M^2
BH CV ECHO MEAS - EDV(CUBED): 183.7 ML
BH CV ECHO MEAS - EDV(MOD-SP4): 57 ML
BH CV ECHO MEAS - EDV(TEICH): 159 ML
BH CV ECHO MEAS - EF(CUBED): 67.2 %
BH CV ECHO MEAS - EF(TEICH): 58.1 %
BH CV ECHO MEAS - ESV(CUBED): 60.2 ML
BH CV ECHO MEAS - ESV(MOD-SP4): 18 ML
BH CV ECHO MEAS - ESV(TEICH): 66.7 ML
BH CV ECHO MEAS - FS: 31.1 %
BH CV ECHO MEAS - IVS/LVPW: 0.95
BH CV ECHO MEAS - IVSD: 0.9 CM
BH CV ECHO MEAS - IVSS: 1.1 CM
BH CV ECHO MEAS - LA DIMENSION: 2.2 CM
BH CV ECHO MEAS - LA/AO: 0.7
BH CV ECHO MEAS - LV DIASTOLIC VOL/BSA (35-75): 29.4 ML/M^2
BH CV ECHO MEAS - LV MASS(C)D: 202.4 GRAMS
BH CV ECHO MEAS - LV MASS(C)DI: 104.5 GRAMS/M^2
BH CV ECHO MEAS - LV MASS(C)S: 185.2 GRAMS
BH CV ECHO MEAS - LV MASS(C)SI: 95.6 GRAMS/M^2
BH CV ECHO MEAS - LV SYSTOLIC VOL/BSA (12-30): 9.3 ML/M^2
BH CV ECHO MEAS - LVIDD: 5.7 CM
BH CV ECHO MEAS - LVIDS: 3.9 CM
BH CV ECHO MEAS - LVLD AP4: 7.3 CM
BH CV ECHO MEAS - LVLS AP4: 6.4 CM
BH CV ECHO MEAS - LVOT AREA (M): 3.1 CM^2
BH CV ECHO MEAS - LVOT AREA: 3 CM^2
BH CV ECHO MEAS - LVOT DIAM: 2 CM
BH CV ECHO MEAS - LVPWD: 0.94 CM
BH CV ECHO MEAS - LVPWS: 1.5 CM
BH CV ECHO MEAS - MV A MAX VEL: 71.1 CM/SEC
BH CV ECHO MEAS - MV E MAX VEL: 94.3 CM/SEC
BH CV ECHO MEAS - MV E/A: 1.3
BH CV ECHO MEAS - PA ACC SLOPE: 890.9 CM/SEC^2
BH CV ECHO MEAS - PA ACC TIME: 0.12 SEC
BH CV ECHO MEAS - PA PR(ACCEL): 23.5 MMHG
BH CV ECHO MEAS - RAP SYSTOLE: 10 MMHG
BH CV ECHO MEAS - RVSP: 31 MMHG
BH CV ECHO MEAS - SI(AO): 105.5 ML/M^2
BH CV ECHO MEAS - SI(CUBED): 63.7 ML/M^2
BH CV ECHO MEAS - SI(MOD-SP4): 20.1 ML/M^2
BH CV ECHO MEAS - SI(TEICH): 47.7 ML/M^2
BH CV ECHO MEAS - SV(AO): 204.3 ML
BH CV ECHO MEAS - SV(CUBED): 123.5 ML
BH CV ECHO MEAS - SV(MOD-SP4): 39 ML
BH CV ECHO MEAS - SV(TEICH): 92.4 ML
BH CV ECHO MEAS - TR MAX VEL: 228.9 CM/SEC
MAXIMAL PREDICTED HEART RATE: 154 BPM
STRESS TARGET HR: 131 BPM

## 2018-04-18 ENCOUNTER — OFFICE VISIT (OUTPATIENT)
Dept: CARDIOLOGY | Facility: CLINIC | Age: 68
End: 2018-04-18

## 2018-04-18 VITALS
WEIGHT: 176.2 LBS | SYSTOLIC BLOOD PRESSURE: 104 MMHG | RESPIRATION RATE: 16 BRPM | HEIGHT: 69 IN | BODY MASS INDEX: 26.1 KG/M2 | DIASTOLIC BLOOD PRESSURE: 65 MMHG | HEART RATE: 64 BPM

## 2018-04-18 DIAGNOSIS — I49.5 SSS (SICK SINUS SYNDROME) (HCC): ICD-10-CM

## 2018-04-18 DIAGNOSIS — I47.1 PAROXYSMAL SVT (SUPRAVENTRICULAR TACHYCARDIA) (HCC): Primary | ICD-10-CM

## 2018-04-18 DIAGNOSIS — E78.5 DYSLIPIDEMIA: ICD-10-CM

## 2018-04-18 PROCEDURE — 99213 OFFICE O/P EST LOW 20 MIN: CPT | Performed by: PHYSICIAN ASSISTANT

## 2018-04-18 RX ORDER — MULTIPLE VITAMINS W/ MINERALS TAB 9MG-400MCG
1 TAB ORAL DAILY
COMMUNITY
End: 2018-06-22

## 2018-04-18 NOTE — PROGRESS NOTES
No Known Provider  Marvin Mendoza  1950 02/16/2018    Patient Active Problem List   Diagnosis   • SSS (sick sinus syndrome), s/p pacemaker placement 8/2009.   • Dyslipidemia   • Paroxysmal SVT (supraventricular tachycardia)     Dear No Known Provider:    Chief Complaint   Patient presents with   • SICK SINUS SYNDROME     Echo follow-up     Subjective     Marvin Mendoza is a 67 y.o. male with a past medical history significant for Sick sinus syndrome status post prior pacemaker implantation in August 2009, paroxysmal supraventricular tachycardia noted on pacemaker interrogations, and dyslipidemia.  He was previously initiated on flecainide at 50 mg twice daily for SVT noted on pacemaker interrogation.  Most recent interrogation on 1/23/18 did reveal continued episodes of high ventricular rates, likely SVT, but the amount of episodes has significantly decreased since starting flecainide.  He does report intermittent dizziness with no associated shortness of breath, palpitations, or syncopal episodes. He had a repeat transthoracic echocardiogram recently revealing an estimated EF of 56-60%. This is slightly decreased from previous echo in 2017 where it was 60-65%.       Current Outpatient Prescriptions:   •  aspirin 81 MG EC tablet, Take 81 mg by mouth daily., Disp: , Rfl:   •  flecainide (TAMBOCOR) 100 MG tablet, Take 1 tablet by mouth Every 12 (Twelve) Hours., Disp: 180 tablet, Rfl: 1  •  metoprolol tartrate (LOPRESSOR) 25 MG tablet, Take 0.5 tablets by mouth Every 12 (Twelve) Hours., Disp: 90 tablet, Rfl: 1  •  Multiple Vitamins-Minerals (MULTIVITAMIN WITH MINERALS) tablet tablet, Take 1 tablet by mouth Daily., Disp: , Rfl:   •  pravastatin (PRAVACHOL) 40 MG tablet, Take 1 tablet by mouth Daily., Disp: 90 tablet, Rfl: 1  •  sildenafil (VIAGRA) 100 MG tablet, Take 100 mg by mouth as needed for erectile dysfunction., Disp: , Rfl:     The following portions of the patient's history were reviewed and updated  "as appropriate: allergies, current medications, past family history, past medical history, past social history, past surgical history and problem list.    Social History     Social History   • Marital status:      Spouse name: N/A   • Number of children: N/A   • Years of education: N/A     Occupational History   • Not on file.     Social History Main Topics   • Smoking status: Never Smoker   • Smokeless tobacco: Never Used   • Alcohol use Yes      Comment: hx socially,occasionally   • Drug use: No   • Sexual activity: Defer     Other Topics Concern   • Not on file     Social History Narrative   • No narrative on file     Review of Systems   Cardiovascular: Negative for chest pain, leg swelling, palpitations and syncope.   Respiratory: Negative for shortness of breath.    Neurological: Negative for dizziness.     Objective   Blood pressure 104/65, pulse 64, resp. rate 16, height 175.3 cm (69.02\"), weight 79.9 kg (176 lb 3.2 oz).  Body mass index is 26.01 kg/m².    Physical Exam   Constitutional: He is oriented to person, place, and time. He appears well-developed and well-nourished. No distress.   HENT:   Head: Normocephalic and atraumatic.   Hard of hearing.    Eyes: Conjunctivae are normal. Right eye exhibits no discharge. Left eye exhibits no discharge.   Neck: Normal range of motion. Neck supple. Carotid bruit is not present.   Cardiovascular: Normal rate, regular rhythm and normal heart sounds.  Exam reveals no gallop and no friction rub.    No murmur heard.  Pulmonary/Chest: Effort normal and breath sounds normal. No respiratory distress. He has no wheezes. He has no rales. He exhibits no tenderness.   Musculoskeletal: Normal range of motion. He exhibits no edema.   Neurological: He is alert and oriented to person, place, and time.   Skin: Skin is warm and dry. No rash noted. He is not diaphoretic. No erythema. No pallor.   Psychiatric: He has a normal mood and affect. His behavior is normal.   Nursing " note and vitals reviewed.    Procedures  Transthoracic echocardiogram 03/10/17  · Normal left ventricular cavity size and wall thickness noted. All left ventricular wall segments contract normally.  · Estimated EF appears to be in the range of 61 - 65%.  · The aortic valve is structurally normal. No aortic valve regurgitation is present. No aortic valve stenosis is present  · The mitral valve is normal in structure. No mitral valve regurgitation is present. No significant mitral valve stenosis is present.  · The tricuspid valve is normal. No tricuspid valve stenosis is present. No tricuspid valve regurgitation is present. Estimated right ventricular systolic pressure from tricuspid regurgitation is normal (<35 mmHg).  · There is no evidence of pericardial effusion.      Transthoracic echocardiogram to 2/28/18  · Normal left ventricular cavity size and wall thickness noted. All left ventricular wall segments contract normally.  · Estimated EF appears to be in the range of 56 - 60%.  · The aortic valve is structurally normal. No aortic valve regurgitation is present. No aortic valve stenosis is present.  · The mitral valve is normal in structure. Mild mitral valve regurgitation is present. No significant mitral valve stenosis is present.  · The tricuspid valve is normal. No tricuspid valve stenosis is present. No tricuspid valve regurgitation is present. Estimated right ventricular systolic pressure from tricuspid regurgitation is normal (<35 mmHg).  · There is no evidence of pericardial effusion.  Assessment:        Diagnosis Plan   1. Paroxysmal SVT (supraventricular tachycardia)     2. SSS (sick sinus syndrome), s/p pacemaker placement 8/2009.     3. Dyslipidemia          Plan:       1. I reviewed his most recent merlin report for his pacemaker.  He does continue to have intermittent runs of supraventricular tachycardia despite recent increase in flecainide.  Unable to advance his metoprolol secondary to baseline  low blood pressure.  Patient is asymptomatic.  2. He is also noted to have a slight decline in ejection fraction compared to 2017 from 61-65% to 56-60%.  3. Due to his continued episodes of paroxysmal SVT, will refer to electrophysiology for evaluation.  4. Follow-up in 3 months or sooner if needed.    No Follow-up on file.    I appreciate the opportunity to participate in this patient's cardiovascular care.    Best Regards,    Tereza Mann PA-C

## 2018-04-20 ENCOUNTER — TELEPHONE (OUTPATIENT)
Dept: CARDIOLOGY | Facility: CLINIC | Age: 68
End: 2018-04-20

## 2018-04-20 NOTE — TELEPHONE ENCOUNTER
----- Message from ALEX Valverde sent at 4/18/2018  5:41 PM EDT -----  Platelets the patient now that I have reviewed his most recent pacemaker submission from home and he continues to have intermittent episodes of fast heart rate.  I have discussed with Dr. Avina who suggest referral to electrophysiology. I have placed referral.      Called pt no answer LM.

## 2018-04-20 NOTE — TELEPHONE ENCOUNTER
pts wife called and I explained the referral to her for rissa and she stated understanding. I also explained to her that the clinic we are referring to will be calling them to set up an apt. She agreed and said that would be fine.

## 2018-06-22 ENCOUNTER — CONSULT (OUTPATIENT)
Dept: CARDIOLOGY | Facility: CLINIC | Age: 68
End: 2018-06-22

## 2018-06-22 VITALS
WEIGHT: 170 LBS | HEIGHT: 69 IN | BODY MASS INDEX: 25.18 KG/M2 | DIASTOLIC BLOOD PRESSURE: 78 MMHG | SYSTOLIC BLOOD PRESSURE: 110 MMHG | HEART RATE: 65 BPM

## 2018-06-22 DIAGNOSIS — I49.5 SSS (SICK SINUS SYNDROME) (HCC): ICD-10-CM

## 2018-06-22 DIAGNOSIS — I47.1 PAROXYSMAL SVT (SUPRAVENTRICULAR TACHYCARDIA) (HCC): Primary | ICD-10-CM

## 2018-06-22 PROCEDURE — 99214 OFFICE O/P EST MOD 30 MIN: CPT | Performed by: INTERNAL MEDICINE

## 2018-06-22 PROCEDURE — 93280 PM DEVICE PROGR EVAL DUAL: CPT | Performed by: INTERNAL MEDICINE

## 2018-06-22 NOTE — PROGRESS NOTES
Marvin Simon  1950  [phone]  [phone]    06/22/2018    [facility] CARDIOLOGY    No Known Provider  [place] [id number]    REFERRING DOCTOR: Tereza Mann PA-C      Patient ID: Marvin Simon is a 67 y.o. male.    Chief Complaint:   Chief Complaint   Patient presents with   • Atrial Fibrillation   • SSS     Problem List:  1. Sick sinus syndrome    A. status post permanent pacemaker implantation, August 2009   2. Paroxysmal supraventricular tachycardia    A. noted on device interrogation    B.  Started on flecainide 50 mg twice daily    C.  Echocardiogram 2017: EF 60-65%    D. Echocardiogram 2/28/18: EF 56-60%   3. Dyslipidemia    Allergies:   No Known Allergies    Current Medications:    Current Outpatient Prescriptions:   •  aspirin 81 MG EC tablet, Take 81 mg by mouth daily., Disp: , Rfl:   •  flecainide (TAMBOCOR) 100 MG tablet, Take 1 tablet by mouth Every 12 (Twelve) Hours., Disp: 180 tablet, Rfl: 1  •  metoprolol tartrate (LOPRESSOR) 25 MG tablet, Take 0.5 tablets by mouth Every 12 (Twelve) Hours., Disp: 90 tablet, Rfl: 1  •  pravastatin (PRAVACHOL) 40 MG tablet, Take 1 tablet by mouth Daily., Disp: 90 tablet, Rfl: 1  •  sildenafil (VIAGRA) 100 MG tablet, Take 100 mg by mouth as needed for erectile dysfunction., Disp: , Rfl:     History of Present Illness: Mr. simon is a 67-year-old male with the above-noted past medical history.  He is seen today in consultation at the request of Tereza Mann PA-C for paroxysmal supraventricular tachycardia and sick sinus syndrome.  He has a history of sick sinus syndrome in the past, S/P pacemaker implantation 8/2009.  He was noted to have runs of SVT on device interrogations.  He was started on flecainide 50 mg twice daily and subsequently increased to 100 mg bid.  Repeat device interrogation on 1/23/18 revealed high ventricular rates likely SVT but significantly fewer episodes since flecainide was  initiated.  Workup included an echocardiogram in 2017 with an EF of 60-65%.  Repeat echocardiogram 2/28/2018 with EF of 56-60%.  An attempt was made to increase metoprolol but patient's blood pressure prohibits further increase.  He was subsequently referred to EP for further evaluation and management. He denies palpitations, near syncope, CP, SOB. He reports occ. Dizziness when bending over. He reports feeling more tired since starting the Flecainide but otherwise cannot discern a difference.     The following portions of the patient's history were reviewed and updated as appropriate: allergies, current medications, past family history, past medical history, past social history, past surgical history and problem list.      Past Medical History:   Diagnosis Date   • Dyslipidemia    • History of EKG 03/08/2016    ABNORMAL   • SSS (sick sinus syndrome)        Past Surgical History:   Procedure Laterality Date   • HERNIA REPAIR     • PACEMAKER IMPLANTATION  05/01/2009    replacement generator, 03/17/2015       Social History     Social History   • Marital status:      Spouse name: N/A   • Number of children: N/A   • Years of education: N/A     Occupational History   • Not on file.     Social History Main Topics   • Smoking status: Never Smoker   • Smokeless tobacco: Never Used   • Alcohol use Yes      Comment: hx socially,occasionally   • Drug use: No   • Sexual activity: Defer     Other Topics Concern   • Not on file     Social History Narrative   • No narrative on file       Family History   Problem Relation Age of Onset   • Heart attack Father         as of 04/09  still alive at 92 yrs   • Other Father         pacemaker       REVIEW OF SYSTEMS:   CONSTITUTIONAL: No weight loss, fever, chills. + fatigue.   HEENT: Eyes: No visual loss, blurred vision, double vision or yellow sclerae. Ears, Nose, Throat: No hearing loss, sneezing, congestion, runny nose or sore throat.   SKIN: No rash or itching.    "  RESPIRATORY: No shortness of breath, hemoptysis, cough or sputum.   GASTROINTESTINAL: No anorexia, nausea, vomiting or diarrhea. No abdominal pain, bright red blood per rectum or melena.  GENITOURINARY: No burning on urination, hematuria or increased frequency.  NEUROLOGICAL: No headache. + dizziness. No syncope, paralysis, ataxia, numbness or tingling in the extremities. No change in bowel or bladder control.   MUSCULOSKELETAL: No muscle, back pain, joint pain or stiffness.   HEMATOLOGIC: No anemia, bleeding or bruising.   LYMPHATICS: No enlarged nodes. No history of splenectomy.   PSYCHIATRIC: No history of depression, anxiety, hallucinations.   ENDOCRINOLOGIC: No reports of sweating, cold or heat intolerance. No polyuria or polydipsia.   Ext: No edema or bruising      The patient's old records including ambulatory rhythm recordings (ECGs, Holter/event monitor) were reviewed and discussed.           Objective:       Vitals:    06/22/18 1240   BP: 110/78   BP Location: Left arm   Patient Position: Sitting   Pulse: 65   Weight: 77.1 kg (170 lb)   Height: 175.3 cm (69\")         Physical Exam:  Constitutional: well-developed and well-nourished. No distress.   HEENT: Normocephalic. Atraumatic. Conjunctivae are normal. No scleral icterus. Mucus membranes pink and moist.  Neck: Normal carotid pulses. No JVD present. Carotid bruit is not present. No thyromegaly present.   Cardiovascular: RRR. S1 normal, S2 normal. No rubs, murmurs or gallops. PMI is not displaced. Pulses: Radial pulses are 2+ on the right side, and 2+ on the left side. Dorsalis pedis pulses are 2+ on the right side, and 2+ on the left side.   Pulmonary: Non-labored. Clear to auscultation; no rales, wheezes or rhonchi.   Abdominal: Soft. Non tender. Non distended. Normoactive bowel sounds. No masses. No hepatosplenomegaly.   Musculoskeletal:  exhibits no edema, tenderness or deformity.   EXT: No swelling  Neurological: is alert and oriented to person, " place, and time.   Skin: Skin is warm and dry. No rash noted. Non diaphoretic. No cyanosis or erythema. No pallor. Nails show no clubbing. PPM site- without issue.  Psychiatric: Normal mood and affect. Speech is normal and behavior is normal.    Lab Review:   Results for orders placed or performed during the hospital encounter of 02/28/18   Adult Transthoracic Echo Complete W/ Cont if Necessary Per Protocol   Result Value Ref Range    BSA 1.9 m^2    IVSd 0.9 cm    IVSs 1.1 cm    LVIDd 5.7 cm    LVIDs 3.9 cm    LVPWd 0.94 cm    BH CV ECHO DHAVAL - LVPWS 1.5 cm    IVS/LVPW 0.95     FS 31.1 %    EDV(Teich) 159.0 ml    ESV(Teich) 66.7 ml    EF(Teich) 58.1 %    EDV(cubed) 183.7 ml    ESV(cubed) 60.2 ml    EF(cubed) 67.2 %    % IVS thick 22.5 %    % LVPW thick 63.1 %    LV mass(C)d 202.4 grams    LV mass(C)dI 104.5 grams/m^2    LV mass(C)s 185.2 grams    LV mass(C)sI 95.6 grams/m^2    SV(Teich) 92.4 ml    SI(Teich) 47.7 ml/m^2    SV(cubed) 123.5 ml    SI(cubed) 63.7 ml/m^2    Ao root diam 3.1 cm    Ao root area 7.4 cm^2    ACS 2.2 cm    LA dimension 2.2 cm    LA/Ao 0.7     LVOT diam 2.0 cm    LVOT area 3.0 cm^2    LVOT area(traced) 3.1 cm^2    LVLd ap4 7.3 cm    EDV(MOD-sp4) 57.0 ml    LVLs ap4 6.4 cm    ESV(MOD-sp4) 18.0 ml    SV(MOD-sp4) 39.0 ml    SI(MOD-sp4) 20.1 ml/m^2    Ao root area (BSA corrected) 1.6     CONTRAST EF 4CH 68.4 ml/m^2    LV Diastolic corrected for BSA 29.4 ml/m^2    LV Systolic corrected for BSA 9.3 ml/m^2    MV E max kulwinder 94.3 cm/sec    MV A max kulwinder 71.1 cm/sec    MV E/A 1.3     Ao pk kulwinder 130.3 cm/sec    Ao max PG 6.8 mmHg    Ao V2 mean 87.5 cm/sec    Ao mean PG 3.5 mmHg    Ao V2 VTI 27.4 cm    SV(Ao) 204.3 ml    SI(Ao) 105.5 ml/m^2    PA acc slope 890.9 cm/sec^2    PA acc time 0.12 sec    TR max kulwinder 228.9 cm/sec    RVSP(TR) 31.0 mmHg    RAP systole 10.0 mmHg    PA pr(Accel) 23.5 mmHg     CV ECHO DHAVAL - BZI_BMI 25.4 kilograms/m^2     CV ECHO DHAVAL - BSA(HAYCOCK) 2.0 m^2     CV ECHO DHAVAL -  BZI_METRIC_WEIGHT 78.0 kg     CV ECHO DHAVAL - BZI_METRIC_HEIGHT 175.3 cm    Target HR (85%) 131 bpm    Max. Pred. HR (100%) 154 bpm         ECG 12 Lead  Date/Time: 6/22/2018 12:55 PM  Performed by: MAUREEN NICOLE  Authorized by: MAUREEN NICOLE   Comparison: compared with previous ECG from 10/19/2017  Rhythm: paced  BPM: 65            Device check 6/22/18: [employer] dual-chamber pacemaker.  DDDR 6510 bpm.  RA paced 90% RV paced 60%.  P waves 2.4 mV.  R-wave 3.3 mV.  Threshold impedances within normal limits.  Mode switching less than 1% longest 21 hours.  High ventricular rate of 54 1 81 bpm.  10 years left on better.      Diagnosis:   1. Paroxysmal SVT (supraventricular tachycardia)  2. SSS (sick sinus syndrome), s/p pacemaker placement 8/2009.  Assessment & Plan:   1) Paroxsymal supraventricular tachycardia/Atrial flutter: SVT noted per device check. Started on BB and Flecainide with improvement in frequency of SVT. QRS acceptable. Device check today with episodes of SVT and ?Atrial flutter. CHADSVASC = 1 (age). Normal EF. Asymptomatic.   - Discussed with patient EPS to confirm SVT vs. Atrial flutter or more conservative approach and continue Flecainide, BB and ASA since patient has a low CHADSVASC score and is asymptomatic. Since patient is asymptomatic and only short lived episodes since starting of flecainide with most recent of 39 minutes and prior to that about 20 minutes patient wants to just continue with medical therapy for now.  If recurrent symptomatic episodes or longer in duration than at that time consideration of an EP study plus or minus ablation.  Continue just aspirin for now and if longer episodes special what seemed to be the atrial flutter then would consider starting on full anticoagulation.    2) Sick sinus syndrome, s/p PPM implant with normal function on today's device check.     3) follow-up in 3 months or sooner as needed.       CC: Tereza Mann PA-C    Scribed for  Carl Trevino DO by WERO Lund, APRN. 6/22/2018  12:52 PM    I, Carl Trevino DO, personally performed the services described in this documentation as scribed by the above named individual in my presence, and it is both accurate and complete.  6/22/2018  1:31 PM    Carl Trevino DO  1:31 PM  06/22/18

## 2018-07-23 RX ORDER — PRAVASTATIN SODIUM 40 MG
TABLET ORAL
Qty: 90 TABLET | Refills: 1 | Status: SHIPPED | OUTPATIENT
Start: 2018-07-23 | End: 2018-10-30 | Stop reason: SDUPTHER

## 2018-07-23 RX ORDER — FLECAINIDE ACETATE 100 MG/1
TABLET ORAL
Qty: 180 TABLET | Refills: 1 | Status: SHIPPED | OUTPATIENT
Start: 2018-07-23 | End: 2018-10-30 | Stop reason: SDUPTHER

## 2018-08-09 ENCOUNTER — CLINICAL SUPPORT (OUTPATIENT)
Dept: CARDIOLOGY | Facility: CLINIC | Age: 68
End: 2018-08-09

## 2018-08-09 DIAGNOSIS — I49.5 SSS (SICK SINUS SYNDROME) (HCC): Primary | ICD-10-CM

## 2018-08-09 PROCEDURE — 93288 INTERROG EVL PM/LDLS PM IP: CPT | Performed by: INTERNAL MEDICINE

## 2018-10-19 ENCOUNTER — OFFICE VISIT (OUTPATIENT)
Dept: CARDIOLOGY | Facility: CLINIC | Age: 68
End: 2018-10-19

## 2018-10-19 VITALS
DIASTOLIC BLOOD PRESSURE: 64 MMHG | SYSTOLIC BLOOD PRESSURE: 100 MMHG | BODY MASS INDEX: 33.38 KG/M2 | HEIGHT: 60 IN | HEART RATE: 60 BPM | WEIGHT: 170 LBS

## 2018-10-19 DIAGNOSIS — I49.5 SSS (SICK SINUS SYNDROME) (HCC): Primary | ICD-10-CM

## 2018-10-19 DIAGNOSIS — I47.1 PAROXYSMAL SVT (SUPRAVENTRICULAR TACHYCARDIA) (HCC): ICD-10-CM

## 2018-10-19 PROCEDURE — 99213 OFFICE O/P EST LOW 20 MIN: CPT | Performed by: PHYSICIAN ASSISTANT

## 2018-10-19 PROCEDURE — 93280 PM DEVICE PROGR EVAL DUAL: CPT | Performed by: PHYSICIAN ASSISTANT

## 2018-10-19 NOTE — PROGRESS NOTES
Marvin Mendoza  1950  [phone]  [phone]    06/22/2018    [facility] CARDIOLOGY    Aikat, Tripp German MD  [address] [place] [address]    REFERRING DOCTOR: Tereza Mann PA-C      Patient ID: Marvin Mendoza is a 68 y.o. male.    Chief Complaint:   PPM, SVT    Problem List:  1. Sick sinus syndrome    A. status post permanent pacemaker implantation, August 2009   2. Paroxysmal supraventricular tachycardia    A. noted on device interrogation    B.  Started on flecainide 50 mg twice daily    C.  Echocardiogram 2017: EF 60-65%    D. Echocardiogram 2/28/18: EF 56-60%   3. Dyslipidemia    Allergies:   No Known Allergies    Current Medications:    Current Outpatient Prescriptions:   •  aspirin 81 MG EC tablet, Take 81 mg by mouth daily., Disp: , Rfl:   •  flecainide (TAMBOCOR) 100 MG tablet, TAKE 1 TABLET EVERY 12 HOURS, Disp: 180 tablet, Rfl: 1  •  metoprolol tartrate (LOPRESSOR) 25 MG tablet, TAKE ONE-HALF (1/2) TABLET EVERY 12 HOURS, Disp: 90 tablet, Rfl: 1  •  pravastatin (PRAVACHOL) 40 MG tablet, TAKE 1 TABLET DAILY, Disp: 90 tablet, Rfl: 1  •  sildenafil (VIAGRA) 100 MG tablet, Take 100 mg by mouth as needed for erectile dysfunction., Disp: , Rfl:     History of Present Illness: Mr. mendoza is a 67-year-old male with the above-noted past medical history.  He is here today for follow up of his paroxysmal supraventricular tachycardia and sick sinus syndrome.  He has a history of sick sinus syndrome in the past, S/P pacemaker implantation 8/2009.  He was noted to have runs of SVT on device interrogations.  He was started on flecainide 50 mg twice daily and subsequently increased to 100 mg bid.  Repeat device interrogation on 1/23/18 revealed high ventricular rates likely SVT but significantly fewer episodes since flecainide was initiated.  Workup included an echocardiogram in 2017 with an EF of 60-65%.  Repeat echocardiogram 2/28/2018 with EF of 56-60%.  An attempt was made  "to increase metoprolol but patient's blood pressure prohibits further increase.   He denies palpitations, near syncope, CP, SOB. He reports occ. Dizziness when bending over.       Has been doing well since his last visit. No symptoms of palpitations, cp, sob, dizziness, syncope.     The following portions of the patient's history were reviewed and updated as appropriate: allergies, current medications, past family history, past medical history, past social history, past surgical history and problem list.      Past Medical History:   Diagnosis Date   • Dyslipidemia    • History of EKG 03/08/2016    ABNORMAL   • SSS (sick sinus syndrome) (CMS/[facility])             Objective:       Vitals:    10/19/18 1342   BP: 100/64   BP Location: Right arm   Patient Position: Sitting   Pulse: 60   Weight: 77.1 kg (170 lb)   Height: 69 cm (27.17\")         Physical Exam:  Constitutional: well-developed and well-nourished. No distress.   HEENT: Normocephalic. Atraumatic. Conjunctivae are normal. No scleral icterus. Mucus membranes pink and moist.  Neck: Normal carotid pulses. No JVD present. Carotid bruit is not present. No thyromegaly present.   Cardiovascular: RRR. S1 normal, S2 normal. No rubs, murmurs or gallops. PMI is not displaced. Pulses: Radial pulses are 2+ on the right side, and 2+ on the left side. Dorsalis pedis pulses are 2+ on the right side, and 2+ on the left side.   Pulmonary: Non-labored. Clear to auscultation; no rales, wheezes or rhonchi.   Abdominal: Soft. Non tender. Non distended. Normoactive bowel sounds. No masses. No hepatosplenomegaly.   Musculoskeletal:  exhibits no edema, tenderness or deformity.   EXT: No swelling  Neurological: is alert and oriented to person, place, and time.   Skin: Skin is warm and dry. No rash noted. Non diaphoretic. No cyanosis or erythema. No pallor. Nails show no clubbing. PPM site- without issue.  Psychiatric: Normal mood and affect. Speech is normal and behavior is normal.    Lab " Review:   Results for orders placed or performed during the hospital encounter of 02/28/18   Adult Transthoracic Echo Complete W/ Cont if Necessary Per Protocol   Result Value Ref Range    BSA 1.9 m^2    IVSd 0.9 cm    IVSs 1.1 cm    LVIDd 5.7 cm    LVIDs 3.9 cm    LVPWd 0.94 cm     CV ECHO DHAVAL - LVPWS 1.5 cm    IVS/LVPW 0.95     FS 31.1 %    EDV(Teich) 159.0 ml    ESV(Teich) 66.7 ml    EF(Teich) 58.1 %    EDV(cubed) 183.7 ml    ESV(cubed) 60.2 ml    EF(cubed) 67.2 %    % IVS thick 22.5 %    % LVPW thick 63.1 %    LV mass(C)d 202.4 grams    LV mass(C)dI 104.5 grams/m^2    LV mass(C)s 185.2 grams    LV mass(C)sI 95.6 grams/m^2    SV(Teich) 92.4 ml    SI(Teich) 47.7 ml/m^2    SV(cubed) 123.5 ml    SI(cubed) 63.7 ml/m^2    Ao root diam 3.1 cm    Ao root area 7.4 cm^2    ACS 2.2 cm    LA dimension 2.2 cm    LA/Ao 0.7     LVOT diam 2.0 cm    LVOT area 3.0 cm^2    LVOT area(traced) 3.1 cm^2    LVLd ap4 7.3 cm    EDV(MOD-sp4) 57.0 ml    LVLs ap4 6.4 cm    ESV(MOD-sp4) 18.0 ml    SV(MOD-sp4) 39.0 ml    SI(MOD-sp4) 20.1 ml/m^2    Ao root area (BSA corrected) 1.6     EF - Contrast (4Ch) 68.4 ml/m^2    LV Lopez Vol (BSA corrected) 29.4 ml/m^2    LV Sys Vol (BSA corrected) 9.3 ml/m^2    MV E max kulwinder 94.3 cm/sec    MV A max kulwinder 71.1 cm/sec    MV E/A 1.3     Ao pk kulwinder 130.3 cm/sec    Ao max PG 6.8 mmHg    Ao V2 mean 87.5 cm/sec    Ao mean PG 3.5 mmHg    Ao V2 VTI 27.4 cm    SV(Ao) 204.3 ml    SI(Ao) 105.5 ml/m^2    PA acc slope 890.9 cm/sec^2    PA acc time 0.12 sec    TR max kulwinder 228.9 cm/sec    RVSP(TR) 31.0 mmHg    RAP systole 10.0 mmHg    PA pr(Accel) 23.5 mmHg     CV ECHO DHAVAL - BZI_BMI 25.4 kilograms/m^2     CV ECHO DHAVAL - BSA(HAYCOCK) 2.0 m^2     CV ECHO DHAVAL - BZI_METRIC_WEIGHT 78.0 kg     CV ECHO DHAVAL - BZI_METRIC_HEIGHT 175.3 cm    Target HR (85%) 131 bpm    Max. Pred. HR (100%) 154 bpm         ECG 12 Lead  Date/Time: 10/19/2018 12:40 PM  Performed by: FAITH CASILLAS  Authorized by: FAITH CASILLAS   Rhythm:  sinus rhythm  Rate: normal  BPM: 65  Conduction: right bundle branch block  QRS axis: left            Device check 10/19/18: [employer] dual-chamber pacemaker.  1 HVR. Normal function. Battery 9 years.   Diagnosis:   1. Paroxysmal SVT (supraventricular tachycardia)  2. SSS (sick sinus syndrome), s/p pacemaker placement 8/2009.  Assessment & Plan:   1) Paroxsymal supraventricular tachycardia/Atrial flutter: SVT noted per device check. Started on BB and Flecainide with improvement in frequency of SVT. QRS acceptable. Device check today with only one episode of SVT. Continue current course.    2) Sick sinus syndrome, s/p PPM implant with normal function on today's device check.   3) follow-up in 6 months     Nancy Howell PA-C   1:47 PM  10/19/18

## 2018-10-30 ENCOUNTER — OFFICE VISIT (OUTPATIENT)
Dept: CARDIOLOGY | Facility: CLINIC | Age: 68
End: 2018-10-30

## 2018-10-30 VITALS
DIASTOLIC BLOOD PRESSURE: 73 MMHG | SYSTOLIC BLOOD PRESSURE: 115 MMHG | WEIGHT: 177 LBS | HEART RATE: 73 BPM | RESPIRATION RATE: 16 BRPM | BODY MASS INDEX: 26.22 KG/M2 | HEIGHT: 69 IN

## 2018-10-30 DIAGNOSIS — I49.5 SSS (SICK SINUS SYNDROME) (HCC): Primary | ICD-10-CM

## 2018-10-30 DIAGNOSIS — I47.1 PAROXYSMAL SVT (SUPRAVENTRICULAR TACHYCARDIA) (HCC): ICD-10-CM

## 2018-10-30 DIAGNOSIS — E78.5 DYSLIPIDEMIA: ICD-10-CM

## 2018-10-30 PROCEDURE — 99213 OFFICE O/P EST LOW 20 MIN: CPT | Performed by: INTERNAL MEDICINE

## 2018-10-30 RX ORDER — FLECAINIDE ACETATE 100 MG/1
100 TABLET ORAL EVERY 12 HOURS
Qty: 180 TABLET | Refills: 2 | Status: SHIPPED | OUTPATIENT
Start: 2018-10-30 | End: 2019-04-30 | Stop reason: SDUPTHER

## 2018-10-30 RX ORDER — PRAVASTATIN SODIUM 40 MG
40 TABLET ORAL DAILY
Qty: 90 TABLET | Refills: 1 | Status: SHIPPED | OUTPATIENT
Start: 2018-10-30 | End: 2019-04-30 | Stop reason: SDUPTHER

## 2018-10-30 NOTE — PROGRESS NOTES
Tripp Núñez MD  Marvin Mendoza  1950  10/30/2018    Patient Active Problem List   Diagnosis   • SSS (sick sinus syndrome), s/p pacemaker placement 8/2009.   • Dyslipidemia   • Paroxysmal SVT (supraventricular tachycardia) (CMS/[facility])       Dear Tripp Núñez MD:    Subjective     Marvin Mendoza is a 68 y.o. male with the problems as listed above, presents    Complaint: Follow-up of sick sinus syndrome and paroxysmal SVT.    History of Present Illness: Mr. Mendoza is a pleasant 62-year-old  male with history of sick sinus syndrome for which she has had permanent pacemaker implanted in August 2009 with recent battery replacement.  He was noted to have intermittent runs of SVT on pacemaker integration and was started on flecainide 100 mg by mouth twice a day and metoprolol.  On further questioning  she denies any complaints of palpitations, dizziness or syncope and overall he feels well.  The seen by Dr. Trevino and no further recommendations were made regarding his arrhythmias.    No Known Allergies:      Current Outpatient Prescriptions:   •  aspirin 81 MG EC tablet, Take 81 mg by mouth daily., Disp: , Rfl:   •  flecainide (TAMBOCOR) 100 MG tablet, Take 1 tablet by mouth Every 12 (Twelve) Hours., Disp: 180 tablet, Rfl: 2  •  metoprolol tartrate (LOPRESSOR) 25 MG tablet, Take 0.5 tablets by mouth Every 12 (Twelve) Hours., Disp: 90 tablet, Rfl: 2  •  Multiple Vitamin (MULTI-VITAMIN DAILY PO), Take  by mouth., Disp: , Rfl:   •  pravastatin (PRAVACHOL) 40 MG tablet, Take 1 tablet by mouth Daily., Disp: 90 tablet, Rfl: 1  •  sildenafil (VIAGRA) 100 MG tablet, Take 100 mg by mouth as needed for erectile dysfunction., Disp: , Rfl:       The following portions of the patient's history were reviewed and updated as appropriate: allergies, current medications, past family history, past medical history, past social history, past surgical history and problem list.    Social History   Substance Use  "Topics   • Smoking status: Never Smoker   • Smokeless tobacco: Never Used   • Alcohol use Yes      Comment: hx socially,occasionally       Review of Systems   Cardiovascular: Negative for chest pain, leg swelling and palpitations.   Respiratory: Negative for shortness of breath.        Objective   Vitals:    10/30/18 1331   BP: 115/73   Pulse: 73   Resp: 16   Weight: 80.3 kg (177 lb)   Height: 175.3 cm (69\")     Body mass index is 26.14 kg/m².      Physical Exam   Constitutional: He is oriented to person, place, and time. He appears well-developed and well-nourished.   HENT:   Mouth/Throat: Oropharynx is clear and moist.   Eyes: Pupils are equal, round, and reactive to light. EOM are normal.   Neck: Neck supple. No JVD present. No tracheal deviation present. No thyromegaly present.   Cardiovascular: Normal rate, regular rhythm, S1 normal and S2 normal.  Exam reveals no gallop, no S3, no S4 and no friction rub.    No murmur heard.  Pulmonary/Chest: Effort normal and breath sounds normal.   Abdominal: Soft. Bowel sounds are normal. He exhibits no mass. There is no tenderness.   Musculoskeletal: Normal range of motion. He exhibits no edema.   Lymphadenopathy:     He has no cervical adenopathy.   Neurological: He is alert and oriented to person, place, and time.   Skin: Skin is warm and dry. No rash noted.   Psychiatric: He has a normal mood and affect.       Lab Results   Component Value Date     02/28/2018    K 4.3 02/28/2018     02/28/2018    CO2 29.1 02/28/2018    BUN 14 02/28/2018    CREATININE 0.91 02/28/2018    GLUCOSE 82 02/28/2018    CALCIUM 8.9 02/28/2018    AST 32 09/08/2015    ALT 40 09/08/2015    ALKPHOS 96 09/08/2015    LABIL2 1.4 (L) 09/08/2015     No results found for: CKTOTAL  Lab Results   Component Value Date    WBC 5.0 03/17/2015    HGB 14.1 03/17/2015    HCT 42.4 03/17/2015     03/17/2015     Lab Results   Component Value Date    INR 0.96 03/17/2015     Lab Results   Component " Value Date    MG 2.1 02/28/2018     Lab Results   Component Value Date    CHLPL 204 (H) 09/08/2015    TRIG 30 06/21/2016    HDL 65 06/21/2016    LDL 75 06/21/2016      Procedures    Assessment/Plan    Diagnosis Plan   1. SSS (sick sinus syndrome), s/p pacemaker placement 8/2009.     2. Paroxysmal SVT (supraventricular tachycardia) seems controlled with flecainide.      3. Dyslipidemia          Recommendations:  1. Continue with flecainide and metoprolol at current doses.  2. Follow up in 6 months.    Return in about 6 months (around 4/30/2019).    As always,  I appreciate very much the opportunity to participate in the cardiovascular care of your patients. Please do not hesitate to call me with any questions with regards to Marvin Mendoza evaluation and management.       With Best Regards,        Joseph Avina MD, [facility]    Dragon disclaimer:  Much of this encounter note is an electronic transcription/translation of spoken language to printed text. The electronic translation of spoken language may permit erroneous, or at times, nonsensical words or phrases to be inadvertently transcribed; Although I have reviewed the note for such errors, some may still exist.

## 2018-11-22 ENCOUNTER — TREATMENT (OUTPATIENT)
Dept: CARDIOLOGY | Facility: CLINIC | Age: 68
End: 2018-11-22

## 2018-11-22 DIAGNOSIS — I49.5 SSS (SICK SINUS SYNDROME) (HCC): Primary | ICD-10-CM

## 2018-11-22 PROCEDURE — 93294 REM INTERROG EVL PM/LDLS PM: CPT | Performed by: INTERNAL MEDICINE

## 2018-11-22 PROCEDURE — 93296 REM INTERROG EVL PM/IDS: CPT | Performed by: INTERNAL MEDICINE

## 2019-03-28 ENCOUNTER — TREATMENT (OUTPATIENT)
Dept: CARDIOLOGY | Facility: CLINIC | Age: 69
End: 2019-03-28

## 2019-03-28 DIAGNOSIS — I49.5 SSS (SICK SINUS SYNDROME) (HCC): Primary | ICD-10-CM

## 2019-03-28 PROCEDURE — 93296 REM INTERROG EVL PM/IDS: CPT | Performed by: INTERNAL MEDICINE

## 2019-03-28 PROCEDURE — 93294 REM INTERROG EVL PM/LDLS PM: CPT | Performed by: INTERNAL MEDICINE

## 2019-04-30 ENCOUNTER — OFFICE VISIT (OUTPATIENT)
Dept: CARDIOLOGY | Facility: CLINIC | Age: 69
End: 2019-04-30

## 2019-04-30 VITALS
BODY MASS INDEX: 27.22 KG/M2 | SYSTOLIC BLOOD PRESSURE: 120 MMHG | HEART RATE: 65 BPM | HEIGHT: 69 IN | DIASTOLIC BLOOD PRESSURE: 80 MMHG | OXYGEN SATURATION: 95 % | WEIGHT: 183.8 LBS

## 2019-04-30 DIAGNOSIS — I49.5 SSS (SICK SINUS SYNDROME) (HCC): Primary | ICD-10-CM

## 2019-04-30 DIAGNOSIS — I47.1 PAROXYSMAL SVT (SUPRAVENTRICULAR TACHYCARDIA) (HCC): ICD-10-CM

## 2019-04-30 DIAGNOSIS — E78.5 DYSLIPIDEMIA: ICD-10-CM

## 2019-04-30 PROCEDURE — 93000 ELECTROCARDIOGRAM COMPLETE: CPT | Performed by: PHYSICIAN ASSISTANT

## 2019-04-30 PROCEDURE — 99213 OFFICE O/P EST LOW 20 MIN: CPT | Performed by: PHYSICIAN ASSISTANT

## 2019-04-30 RX ORDER — SILDENAFIL 100 MG/1
100 TABLET, FILM COATED ORAL AS NEEDED
Qty: 30 TABLET | Refills: 3 | Status: SHIPPED | OUTPATIENT
Start: 2019-04-30

## 2019-04-30 RX ORDER — TAMSULOSIN HYDROCHLORIDE 0.4 MG/1
1 CAPSULE ORAL NIGHTLY
COMMUNITY

## 2019-04-30 RX ORDER — FLECAINIDE ACETATE 100 MG/1
100 TABLET ORAL EVERY 12 HOURS
Qty: 180 TABLET | Refills: 2 | Status: SHIPPED | OUTPATIENT
Start: 2019-04-30 | End: 2019-12-17

## 2019-04-30 RX ORDER — ASPIRIN 81 MG/1
81 TABLET ORAL DAILY
Qty: 90 TABLET | Refills: 3 | Status: SHIPPED | OUTPATIENT
Start: 2019-04-30

## 2019-04-30 RX ORDER — PRAVASTATIN SODIUM 40 MG
40 TABLET ORAL DAILY
Qty: 90 TABLET | Refills: 2 | Status: SHIPPED | OUTPATIENT
Start: 2019-04-30

## 2019-04-30 RX ORDER — SULFAMETHOXAZOLE AND TRIMETHOPRIM 800; 160 MG/1; MG/1
TABLET ORAL
Refills: 0 | COMMUNITY
Start: 2019-04-28 | End: 2019-06-14

## 2019-04-30 NOTE — PROGRESS NOTES
Tripp Núñez MD  Marvin Mendoza  1950 04/30/2019    Patient Active Problem List   Diagnosis   • SSS (sick sinus syndrome), s/p pacemaker placement 8/2009.   • Dyslipidemia   • Paroxysmal SVT (supraventricular tachycardia) (CMS/[facility])       Dear Tripp Núñez MD:    Subjective     History of Present Illness:    Chief Complaint   Patient presents with   • Follow-up     SSS   • Med Management     Med list.    • Dizziness       Marvin Mendoza is a pleasant 68 y.o. male with a past medical history significant for sick sinus syndrome status post permanent pacemaker implantation 2009 and dyslipidemia.  Patient comes in for routine cardiology follow-up.    Reports he has been doing well. Patient denies any chest pain, shortness of breath, palpitations, dizziness, syncope or near syncope.      No Known Allergies:      Current Outpatient Medications:   •  aspirin 81 MG EC tablet, Take 1 tablet by mouth Daily., Disp: 90 tablet, Rfl: 3  •  flecainide (TAMBOCOR) 100 MG tablet, Take 1 tablet by mouth Every 12 (Twelve) Hours., Disp: 180 tablet, Rfl: 2  •  metoprolol tartrate (LOPRESSOR) 25 MG tablet, Take 0.5 tablets by mouth Every 12 (Twelve) Hours., Disp: 90 tablet, Rfl: 2  •  Multiple Vitamin (MULTI-VITAMIN DAILY PO), Take  by mouth., Disp: , Rfl:   •  pravastatin (PRAVACHOL) 40 MG tablet, Take 1 tablet by mouth Daily., Disp: 90 tablet, Rfl: 2  •  sildenafil (VIAGRA) 100 MG tablet, Take 1 tablet by mouth As Needed for erectile dysfunction., Disp: 30 tablet, Rfl: 3  •  sulfamethoxazole-trimethoprim (BACTRIM DS,SEPTRA DS) 800-160 MG per tablet, , Disp: , Rfl: 0  •  tamsulosin (FLOMAX) 0.4 MG capsule 24 hr capsule, Take 1 capsule by mouth Every Night., Disp: , Rfl:     The following portions of the patient's history were reviewed and updated as appropriate: allergies, current medications, past family history, past medical history, past social history, past surgical history and problem list.    Social History  "    Tobacco Use   • Smoking status: Never Smoker   • Smokeless tobacco: Never Used   Substance Use Topics   • Alcohol use: Yes     Comment: hx socially,occasionally   • Drug use: No       Review of Systems   Constitution: Negative for weakness and malaise/fatigue.   Cardiovascular: Negative for chest pain, dyspnea on exertion, irregular heartbeat, palpitations and syncope.   Respiratory: Negative for cough and shortness of breath.    Hematologic/Lymphatic: Negative for bleeding problem. Does not bruise/bleed easily.   Gastrointestinal: Negative for nausea and vomiting.   Neurological: Positive for dizziness.       Objective   Vitals:    04/30/19 0832   BP: 120/80   BP Location: Right arm   Patient Position: Sitting   Cuff Size: Adult   Pulse: 65   SpO2: 95%   Weight: 83.4 kg (183 lb 12.8 oz)   Height: 175.3 cm (69\")     Body mass index is 27.14 kg/m².    Physical Exam   Constitutional: He is oriented to person, place, and time. He appears well-developed and well-nourished. No distress.   HENT:   Head: Normocephalic and atraumatic.   Cardiovascular: Normal rate, regular rhythm, normal heart sounds and intact distal pulses.   Pulmonary/Chest: Effort normal and breath sounds normal. No respiratory distress.   Musculoskeletal: He exhibits no edema.   Neurological: He is alert and oriented to person, place, and time.   Skin: He is not diaphoretic.       Lab Results   Component Value Date     02/28/2018    K 4.3 02/28/2018     02/28/2018    CO2 29.1 02/28/2018    BUN 14 02/28/2018    CREATININE 0.91 02/28/2018    GLUCOSE 82 02/28/2018    CALCIUM 8.9 02/28/2018    AST 32 09/08/2015    ALT 40 09/08/2015    ALKPHOS 96 09/08/2015    LABIL2 1.4 (L) 09/08/2015     No results found for: CKTOTAL  Lab Results   Component Value Date    WBC 5.0 03/17/2015    HGB 14.1 03/17/2015    HCT 42.4 03/17/2015     03/17/2015     Lab Results   Component Value Date    INR 0.96 03/17/2015     Lab Results   Component Value Date "    MG 2.1 02/28/2018     Lab Results   Component Value Date    CHLPL 204 (H) 09/08/2015    TRIG 30 06/21/2016    HDL 65 06/21/2016    LDL 75 06/21/2016      No results found for: BNP    During this visit the following were done:  Labs Reviewed [x]    Labs Ordered []    Radiology Reports Reviewed [x]    Radiology Ordered []    PCP Records Reviewed []    Referring Provider Records Reviewed []    ER Records Reviewed []    Hospital Records Reviewed []    History Obtained From Family []    Radiology Images Reviewed []    Other Reviewed []    Records Requested []         ECG 12 Lead  Date/Time: 4/30/2019 8:27 AM  Performed by: Charbel Pollack PA-C  Authorized by: Charbel Pollack PA-C   Comparison: compared with previous ECG   Similar to previous ECG  Rhythm: paced  Conduction: conduction normal  Pacing: ventricular paced rhythm and dual chamber pacing  Clinical impression: normal ECG            Assessment/Plan    Diagnosis Plan   1. SSS (sick sinus syndrome), s/p pacemaker placement 8/2009.     2. Paroxysmal SVT (supraventricular tachycardia) (CMS/[facility])     3. Dyslipidemia              Recommendations:  1. Patient appears to be doing well from cardiac standpoint.  He has not had pacemaker interrogation since last visit we will schedule this and will adjust medications accordingly if needed.  We will continue pravastatin, metoprolol tartrate, flecainide, and aspirin.  I also refilled his Viagra I again emphasized avoiding any kind of nitrates with this medication he expressed understanding and reports that he does not have any.    Return in about 9 months (around 1/30/2020).    As always, I appreciate very much the opportunity to participate in the cardiovascular care of your patients.      With Best Regards,    Charbel Pollack PA-C

## 2019-06-13 NOTE — PROGRESS NOTES
Marvin Simon  1950  PCP: Tripp Núñez MD    SUBJECTIVE:   Marvin Simon is a 68 y.o. male seen for a follow up visit regarding the following:     Chief Complaint: Follow up for bradycardia, PPM, SVT     HPI:      Mr. simon is a 67-year-old male with the above-noted past medical history.  He is here today for follow up of his paroxysmal supraventricular tachycardia and sick sinus syndrome.  He has a history of sick sinus syndrome in the past, S/P pacemaker implantation 8/2009.  He was noted to have runs of SVT on device interrogations.  Since being on Flecainide, he has had a decrease in episodes of SVT and is asymptomatic. Overall he has been doing well since his last visit. No CP, SOB, edema, palps, orthopnea.           Cardiac PMH: (Old records have been reviewed and summarized below)    1. Sick sinus syndrome               A. status post permanent pacemaker implantation, August 2009   2. Paroxysmal supraventricular tachycardia               A. noted on device interrogation               B.  Started on flecainide 50 mg twice daily               C.  Echocardiogram 2017: EF 60-65%               D. Echocardiogram 2/28/18: EF 56-60%   3. Dyslipidemia              Current Outpatient Medications:   •  aspirin 81 MG EC tablet, Take 1 tablet by mouth Daily., Disp: 90 tablet, Rfl: 3  •  flecainide (TAMBOCOR) 100 MG tablet, Take 1 tablet by mouth Every 12 (Twelve) Hours., Disp: 180 tablet, Rfl: 2  •  metoprolol tartrate (LOPRESSOR) 25 MG tablet, Take 0.5 tablets by mouth Every 12 (Twelve) Hours., Disp: 90 tablet, Rfl: 2  •  Multiple Vitamin (MULTI-VITAMIN DAILY PO), Take  by mouth., Disp: , Rfl:   •  pravastatin (PRAVACHOL) 40 MG tablet, Take 1 tablet by mouth Daily., Disp: 90 tablet, Rfl: 2  •  sildenafil (VIAGRA) 100 MG tablet, Take 1 tablet by mouth As Needed for erectile dysfunction., Disp: 30 tablet, Rfl: 3  •  tamsulosin (FLOMAX) 0.4 MG capsule 24 hr capsule, Take 1 capsule by mouth Every Night.,  "Disp: , Rfl:     Past Medical History, Past Surgical History, Family history, Social History, and Medications were all reviewed with the patient today and updated as necessary.       Patient Active Problem List   Diagnosis   • SSS (sick sinus syndrome), s/p pacemaker placement 8/2009.   • Dyslipidemia   • Paroxysmal SVT (supraventricular tachycardia) (CMS/H[facility])     No Known Allergies  Past Medical History:   Diagnosis Date   • Dyslipidemia    • History of EKG 03/08/2016    ABNORMAL   • SSS (sick sinus syndrome) (CMS/HCC)      Past Surgical History:   Procedure Laterality Date   • HERNIA REPAIR     • PACEMAKER IMPLANTATION  05/01/2009    replacement generator, 03/17/2015     Family History   Problem Relation Age of Onset   • Heart attack Father         as of 04/09  still alive at 92 yrs   • Other Father         pacemaker     Social History     Tobacco Use   • Smoking status: Never Smoker   • Smokeless tobacco: Never Used   Substance Use Topics   • Alcohol use: Yes     Comment: hx socially,occasionally         PHYSICAL EXAM:    /70 (BP Location: Right arm, Patient Position: Sitting)   Pulse 64   Ht 175.3 cm (69\")   Wt 82.6 kg (182 lb 3.2 oz)   BMI 26.91 kg/m²        Wt Readings from Last 5 Encounters:   06/14/19 82.6 kg (182 lb 3.2 oz)   04/30/19 83.4 kg (183 lb 12.8 oz)   10/30/18 80.3 kg (177 lb)   10/19/18 77.1 kg (170 lb)   06/22/18 77.1 kg (170 lb)       BP Readings from Last 5 Encounters:   06/14/19 110/70   04/30/19 120/80   10/30/18 115/73   10/19/18 100/64   06/22/18 110/78       General-Well Nourished, Well developed  Eyes - PERRLA  Neck- supple, No mass  CV- regular rate and rhythm, no MRG, No edema  Lung- clear bilaterally  Abd- soft, +BS  Musc/skel - Norm strength and range of motion  Skin- warm and dry  Neuro - Alert & Oriented x 3, appropriate mood.        Medical problems and test results were reviewed with the patient today.     No results found for this or any previous visit (from the past " 672 hour(s)).      EKG: (EKG has been independently visualized by me and summarized below)      ECG 12 Lead  Date/Time: 6/14/2019 11:11 AM  Performed by: Nancy Howell PA  Authorized by: Nancy Howell PA   Comparison: compared with previous ECG from 4/30/2019  Similar to previous ECG  Rhythm: sinus rhythm and paced  Rate: normal  BPM: 64  QRS axis: normal  Comments: QRS stable             ASSESSMENT and PLAN    1) Paroxsymal supraventricular tachycardia/Atrial flutter: SVT noted per device check. Currently on Flecainide- QRS acceptable. Device check today with only one episode of SVT. Continue Flecainide and Lopressor.   2) Sick sinus syndrome, s/p PPM implant with normal function on today's device check.       Return in about 6 months (around 12/14/2019).      MARCIO SegalC   Cardiology/Electrophysiology  6/14/2019  11:27 AM

## 2019-06-14 ENCOUNTER — OFFICE VISIT (OUTPATIENT)
Dept: CARDIOLOGY | Facility: CLINIC | Age: 69
End: 2019-06-14

## 2019-06-14 VITALS
WEIGHT: 182.2 LBS | HEIGHT: 69 IN | DIASTOLIC BLOOD PRESSURE: 70 MMHG | SYSTOLIC BLOOD PRESSURE: 110 MMHG | HEART RATE: 64 BPM | BODY MASS INDEX: 26.98 KG/M2

## 2019-06-14 DIAGNOSIS — I47.1 PAROXYSMAL SVT (SUPRAVENTRICULAR TACHYCARDIA) (HCC): ICD-10-CM

## 2019-06-14 DIAGNOSIS — I49.5 SSS (SICK SINUS SYNDROME) (HCC): Primary | ICD-10-CM

## 2019-06-14 PROCEDURE — 99213 OFFICE O/P EST LOW 20 MIN: CPT | Performed by: PHYSICIAN ASSISTANT

## 2019-06-14 PROCEDURE — 93280 PM DEVICE PROGR EVAL DUAL: CPT | Performed by: PHYSICIAN ASSISTANT

## 2019-06-27 ENCOUNTER — TREATMENT (OUTPATIENT)
Dept: CARDIOLOGY | Facility: CLINIC | Age: 69
End: 2019-06-27

## 2019-06-27 DIAGNOSIS — I49.5 SSS (SICK SINUS SYNDROME) (HCC): Primary | ICD-10-CM

## 2019-06-27 PROCEDURE — 93294 REM INTERROG EVL PM/LDLS PM: CPT | Performed by: INTERNAL MEDICINE

## 2019-06-27 PROCEDURE — 93296 REM INTERROG EVL PM/IDS: CPT | Performed by: INTERNAL MEDICINE

## 2019-09-26 ENCOUNTER — TREATMENT (OUTPATIENT)
Dept: CARDIOLOGY | Facility: CLINIC | Age: 69
End: 2019-09-26

## 2019-09-26 DIAGNOSIS — I49.5 SSS (SICK SINUS SYNDROME) (HCC): Primary | ICD-10-CM

## 2019-09-26 PROCEDURE — 93294 REM INTERROG EVL PM/LDLS PM: CPT | Performed by: INTERNAL MEDICINE

## 2019-09-26 PROCEDURE — 93296 REM INTERROG EVL PM/IDS: CPT | Performed by: INTERNAL MEDICINE

## 2019-11-01 ENCOUNTER — TELEPHONE (OUTPATIENT)
Dept: CARDIOLOGY | Facility: CLINIC | Age: 69
End: 2019-11-01

## 2019-11-14 ENCOUNTER — CLINICAL SUPPORT (OUTPATIENT)
Dept: CARDIOLOGY | Facility: CLINIC | Age: 69
End: 2019-11-14

## 2019-11-14 DIAGNOSIS — I49.5 SSS (SICK SINUS SYNDROME) (HCC): Primary | ICD-10-CM

## 2019-11-14 PROCEDURE — 93280 PM DEVICE PROGR EVAL DUAL: CPT | Performed by: INTERNAL MEDICINE

## 2019-12-17 ENCOUNTER — OFFICE VISIT (OUTPATIENT)
Dept: CARDIOLOGY | Facility: CLINIC | Age: 69
End: 2019-12-17

## 2019-12-17 VITALS
HEART RATE: 64 BPM | DIASTOLIC BLOOD PRESSURE: 64 MMHG | WEIGHT: 187.4 LBS | BODY MASS INDEX: 27.76 KG/M2 | HEIGHT: 69 IN | RESPIRATION RATE: 16 BRPM | SYSTOLIC BLOOD PRESSURE: 107 MMHG

## 2019-12-17 DIAGNOSIS — I47.1 PAROXYSMAL SVT (SUPRAVENTRICULAR TACHYCARDIA) (HCC): ICD-10-CM

## 2019-12-17 DIAGNOSIS — I49.5 SSS (SICK SINUS SYNDROME) (HCC): Primary | ICD-10-CM

## 2019-12-17 PROCEDURE — 99213 OFFICE O/P EST LOW 20 MIN: CPT | Performed by: INTERNAL MEDICINE

## 2019-12-17 PROCEDURE — 93000 ELECTROCARDIOGRAM COMPLETE: CPT | Performed by: INTERNAL MEDICINE

## 2019-12-17 RX ORDER — FLECAINIDE ACETATE 100 MG/1
150 TABLET ORAL EVERY 12 HOURS
Qty: 270 TABLET | Refills: 2 | Status: SHIPPED | OUTPATIENT
Start: 2019-12-17 | End: 2019-12-17

## 2019-12-17 RX ORDER — FLECAINIDE ACETATE 100 MG/1
100 TABLET ORAL EVERY 12 HOURS
Qty: 180 TABLET | Refills: 2 | Status: SHIPPED | OUTPATIENT
Start: 2019-12-17 | End: 2021-11-17

## 2019-12-17 RX ORDER — FLECAINIDE ACETATE 100 MG/1
100 TABLET ORAL EVERY 12 HOURS
Qty: 180 TABLET | Refills: 2 | Status: SHIPPED | OUTPATIENT
Start: 2019-12-17 | End: 2019-12-17 | Stop reason: SDUPTHER

## 2019-12-17 NOTE — PROGRESS NOTES
Tripp Núñez MD  Marvin Mendoza  1950 12/17/2019    Patient Active Problem List   Diagnosis   • SSS (sick sinus syndrome), s/p pacemaker placement 8/2009.   • Dyslipidemia   • Paroxysmal SVT (supraventricular tachycardia) (CMS/[facility])       Dear Tripp Núñez MD:    Avery Mendoza is a 69 y.o. male with the problems as listed above, presents    Chief Complaint: Follow-up of paroxysmal SVT.       History of Present Illness:  is a pleasant 69-year-old  male with history of sick sinus syndrome, status post permanent dual-chamber pacemaker implantation initially in 2009 and then had a battery change in March 2015 with a [employer] medical device.  He has history of paroxysmal SVT and has been on flecainide which he says he is taking only once a day instead of twice a day.  On recent pacemaker check he was noted to have had 4 runs of SVT since the last pacemaker check in August 2018.  On further questioning  denies any complaints of palpitations, dizziness or syncope recently.    No Known Allergies:      Current Outpatient Medications:   •  Acetaminophen (ARTHRITIS PAIN RELIEF PO), Take 650 mg by mouth As Needed., Disp: , Rfl:   •  aspirin 81 MG EC tablet, Take 1 tablet by mouth Daily., Disp: 90 tablet, Rfl: 3  •  flecainide (TAMBOCOR) 100 MG tablet, Take 1 tablet by mouth Every 12 (Twelve) Hours., Disp: 180 tablet, Rfl: 2  •  metoprolol tartrate (LOPRESSOR) 25 MG tablet, Take 0.5 tablets by mouth Every 12 (Twelve) Hours., Disp: 90 tablet, Rfl: 2  •  Multiple Vitamin (MULTI-VITAMIN DAILY PO), Take  by mouth., Disp: , Rfl:   •  pravastatin (PRAVACHOL) 40 MG tablet, Take 1 tablet by mouth Daily., Disp: 90 tablet, Rfl: 2  •  sildenafil (VIAGRA) 100 MG tablet, Take 1 tablet by mouth As Needed for erectile dysfunction., Disp: 30 tablet, Rfl: 3  •  tamsulosin (FLOMAX) 0.4 MG capsule 24 hr capsule, Take 1 capsule by mouth Every Night., Disp: , Rfl:       The following  "portions of the patient's history were reviewed and updated as appropriate: allergies, current medications, past family history, past medical history, past social history, past surgical history and problem list.    Social History     Tobacco Use   • Smoking status: Never Smoker   • Smokeless tobacco: Never Used   Substance Use Topics   • Alcohol use: Yes     Comment: hx socially,occasionally   • Drug use: No       Review of Systems   Constitution: Negative for chills and fever.   HENT: Negative for nosebleeds and sore throat.    Cardiovascular: Negative for chest pain, leg swelling and palpitations.   Respiratory: Negative for cough, hemoptysis, shortness of breath and wheezing.    Musculoskeletal: Positive for joint pain.   Gastrointestinal: Negative for abdominal pain, hematemesis, hematochezia, melena, nausea and vomiting.   Genitourinary: Negative for dysuria and hematuria.   Neurological: Negative for headaches.       Objective   Vitals:    12/17/19 1243   BP: 107/64   Pulse: 64   Resp: 16   Weight: 85 kg (187 lb 6.4 oz)   Height: 175.3 cm (69\")     Body mass index is 27.67 kg/m².        Physical Exam   Constitutional: He is oriented to person, place, and time. He appears well-developed and well-nourished.   HENT:   Mouth/Throat: Oropharynx is clear and moist.   Eyes: Pupils are equal, round, and reactive to light. EOM are normal.   Neck: Neck supple. No JVD present. No tracheal deviation present. No thyromegaly present.   Cardiovascular: Normal rate, regular rhythm, S1 normal and S2 normal. Exam reveals no gallop and no friction rub.   No murmur heard.  Pulmonary/Chest: Effort normal and breath sounds normal.   Abdominal: Soft. Bowel sounds are normal. He exhibits no mass. There is no tenderness.   Musculoskeletal: Normal range of motion. He exhibits no edema.   Lymphadenopathy:     He has no cervical adenopathy.   Neurological: He is alert and oriented to person, place, and time.   Skin: Skin is warm and dry. " No rash noted.   Psychiatric: He has a normal mood and affect.         Marvin Mendoza   Echocardiogram   Order# [id number]   Reading physician:   Joseph Avina MD Ordering physician:   Tereza Mann PA Study date: 18   Patient Information     Patient Name  Marvin Mendoza MRN  [id number] Sex  Male  (Age)  1950 (69 y.o.)   Interpretation Summary     · Normal left ventricular cavity size and wall thickness noted. All left ventricular wall segments contract normally.  · Estimated EF appears to be in the range of 56 - 60%.  · The aortic valve is structurally normal. No aortic valve regurgitation is present. No aortic valve stenosis is present.  · The mitral valve is normal in structure. Mild mitral valve regurgitation is present. No significant mitral valve stenosis is present.  · The tricuspid valve is normal. No tricuspid valve stenosis is present. No tricuspid valve regurgitation is present. Estimated right ventricular systolic pressure from tricuspid regurgitation is normal (<35 mmHg).  · There is no evidence of pericardial effusion.        Lab Results   Component Value Date     2018    K 4.3 2018     2018    CO2 29.1 2018    BUN 14 2018    CREATININE 0.91 2018    GLUCOSE 82 2018    CALCIUM 8.9 2018    AST 32 2015    ALT 40 2015    ALKPHOS 96 2015    LABIL2 1.4 (L) 2015     No results found for: CKTOTAL  Lab Results   Component Value Date    WBC 5.0 2015    HGB 14.1 2015    HCT 42.4 2015     2015     Lab Results   Component Value Date    INR 0.96 2015     Lab Results   Component Value Date    MG 2.1 2018     Lab Results   Component Value Date    CHLPL 204 (H) 2015    TRIG 30 2016    HDL 65 2016    LDL 75 2016          ECG 12 Lead  Date/Time: 2019 12:45 PM  Performed by: Joseph Avina MD  Authorized by: Joseph Avina MD   Comparison:  compared with previous ECG from 4/30/2019  Similar to previous ECG  Comments: 100% AV sequential pacing.              Assessment/Plan    Diagnosis Plan   1. SSS (sick sinus syndrome), s/p pacemaker placement 8/2009 with pacemaker battery replacement in March 2015 with a [employer] medical device, functioning well.     2. Paroxysmal SVT (supraventricular tachycardia), on flecainide.         Recommendations:  1. I have instructed him to take the flecainide 100 mg twice a day instead of once a day that he is taking now.  2. Continue with metoprolol at 25 mg p.o. twice daily not able to increase the dose due to baseline low blood pressure.  3. Check BMP and a magnesium level.  4. Follow-up in 2 to 3 months.    Return in about 3 months (around 3/17/2020).    As always, Tripp Núñez MD  I appreciate very much the opportunity to participate in the cardiovascular care of your patients. Please do not hesitate to call me with any questions with regards to Marvin Mendoza's evaluation and management.           With Best Regards,        Joseph Avina MD, [facility]    Please note that portions of this note were completed with a voice recognition program.

## 2020-02-18 ENCOUNTER — OFFICE VISIT (OUTPATIENT)
Dept: CARDIOLOGY | Facility: CLINIC | Age: 70
End: 2020-02-18

## 2020-02-18 VITALS
SYSTOLIC BLOOD PRESSURE: 124 MMHG | DIASTOLIC BLOOD PRESSURE: 78 MMHG | HEIGHT: 69 IN | OXYGEN SATURATION: 97 % | BODY MASS INDEX: 27.25 KG/M2 | HEART RATE: 65 BPM | WEIGHT: 184 LBS

## 2020-02-18 DIAGNOSIS — E78.5 DYSLIPIDEMIA: ICD-10-CM

## 2020-02-18 DIAGNOSIS — I49.5 SSS (SICK SINUS SYNDROME) (HCC): Primary | ICD-10-CM

## 2020-02-18 DIAGNOSIS — I47.1 PAROXYSMAL SVT (SUPRAVENTRICULAR TACHYCARDIA) (HCC): ICD-10-CM

## 2020-02-18 PROCEDURE — 99213 OFFICE O/P EST LOW 20 MIN: CPT | Performed by: PHYSICIAN ASSISTANT

## 2020-02-18 RX ORDER — CETIRIZINE HYDROCHLORIDE 10 MG/1
10 TABLET ORAL DAILY
COMMUNITY

## 2020-02-18 NOTE — PROGRESS NOTES
Tripp Núñez MD  Marvin Mendoza  1950 02/18/2020    Patient Active Problem List   Diagnosis   • SSS (sick sinus syndrome), s/p pacemaker placement 8/2009.   • Dyslipidemia   • Paroxysmal SVT (supraventricular tachycardia) (CMS/[facility])       Dear Tripp Núñez MD:    Subjective     History of Present Illness:    Chief Complaint   Patient presents with   • Follow-up     2 mth f/up.    • Med Management     Med list.        Marvin Mendoza is a pleasant 69 y.o. male with a past medical history significant for  sick sinus syndrome, status post permanent dual-chamber pacemaker implantation initially in 2009 and then had a battery change in March 2015 with a [employer] medical device.  He comes in for routine cardiology follow-up.     Overall patient is stable from cardiac standpoint. Patient denies any chest pain, shortness of breath, palpitations, dizziness, syncope or near syncope. His blood pressure has been well controlled as well.     No Known Allergies:      Current Outpatient Medications:   •  Acetaminophen (ARTHRITIS PAIN RELIEF PO), Take 650 mg by mouth As Needed., Disp: , Rfl:   •  aspirin 81 MG EC tablet, Take 1 tablet by mouth Daily., Disp: 90 tablet, Rfl: 3  •  cetirizine (zyrTEC) 10 MG tablet, Take 10 mg by mouth Daily., Disp: , Rfl:   •  flecainide (TAMBOCOR) 100 MG tablet, Take 1 tablet by mouth Every 12 (Twelve) Hours., Disp: 180 tablet, Rfl: 2  •  metoprolol tartrate (LOPRESSOR) 25 MG tablet, Take 0.5 tablets by mouth Every 12 (Twelve) Hours., Disp: 90 tablet, Rfl: 2  •  Multiple Vitamin (MULTI-VITAMIN DAILY PO), Take  by mouth., Disp: , Rfl:   •  pravastatin (PRAVACHOL) 40 MG tablet, Take 1 tablet by mouth Daily., Disp: 90 tablet, Rfl: 2  •  sildenafil (VIAGRA) 100 MG tablet, Take 1 tablet by mouth As Needed for erectile dysfunction., Disp: 30 tablet, Rfl: 3  •  tamsulosin (FLOMAX) 0.4 MG capsule 24 hr capsule, Take 1 capsule by mouth Every Night., Disp: , Rfl:     The following portions  "of the patient's history were reviewed and updated as appropriate: allergies, current medications, past family history, past medical history, past social history, past surgical history and problem list.    Social History     Tobacco Use   • Smoking status: Never Smoker   • Smokeless tobacco: Never Used   Substance Use Topics   • Alcohol use: Yes     Comment: hx socially,occasionally   • Drug use: No       Review of Systems   Constitution: Negative for malaise/fatigue.   Cardiovascular: Negative for chest pain, dyspnea on exertion and irregular heartbeat.   Respiratory: Negative for cough and shortness of breath.    Hematologic/Lymphatic: Negative for bleeding problem. Does not bruise/bleed easily.   Gastrointestinal: Negative for nausea and vomiting.   Neurological: Negative for weakness.       Objective   Vitals:    02/18/20 0817   BP: 124/78   BP Location: Left arm   Patient Position: Sitting   Cuff Size: Adult   Pulse: 65   SpO2: 97%   Weight: 83.5 kg (184 lb)   Height: 175.3 cm (69\")     Body mass index is 27.17 kg/m².    Physical Exam   Constitutional: He is oriented to person, place, and time. He appears well-developed and well-nourished. No distress.   HENT:   Head: Normocephalic and atraumatic.   Cardiovascular: Normal rate, regular rhythm and normal heart sounds.   Pulmonary/Chest: Effort normal and breath sounds normal. No respiratory distress.   Musculoskeletal: He exhibits no edema.   Neurological: He is alert and oriented to person, place, and time.   Skin: He is not diaphoretic.       Lab Results   Component Value Date     02/28/2018    K 4.3 02/28/2018     02/28/2018    CO2 29.1 02/28/2018    BUN 14 02/28/2018    CREATININE 0.91 02/28/2018    GLUCOSE 82 02/28/2018    CALCIUM 8.9 02/28/2018    AST 32 09/08/2015    ALT 40 09/08/2015    ALKPHOS 96 09/08/2015    LABIL2 1.4 (L) 09/08/2015     No results found for: CKTOTAL  Lab Results   Component Value Date    WBC 5.0 03/17/2015    HGB 14.1 " 03/17/2015    HCT 42.4 03/17/2015     03/17/2015     Lab Results   Component Value Date    INR 0.96 03/17/2015     Lab Results   Component Value Date    MG 2.1 02/28/2018     Lab Results   Component Value Date    CHLPL 204 (H) 09/08/2015    TRIG 30 06/21/2016    HDL 65 06/21/2016    LDL 75 06/21/2016      No results found for: BNP    During this visit the following were done:  Labs Reviewed [x]    Labs Ordered []    Radiology Reports Reviewed [x]    Radiology Ordered []    PCP Records Reviewed []    Referring Provider Records Reviewed []    ER Records Reviewed []    Hospital Records Reviewed []    History Obtained From Family []    Radiology Images Reviewed []    Other Reviewed []    Records Requested []       Procedures    Assessment/Plan    Diagnosis Plan   1. SSS (sick sinus syndrome), s/p pacemaker placement 8/2009.     2. Paroxysmal SVT (supraventricular tachycardia) (CMS/[facility])  Magnesium   3. Dyslipidemia              Recommendations:  1. I will order magnesium to monitor this as patient did not get this done.   2. Otherwise patient is stable, will continue flecainide, pravastatin, metoprolol, and aspirin.       Return in about 6 months (around 8/18/2020).    As always, I appreciate very much the opportunity to participate in the cardiovascular care of your patients.      With Best Regards,    Charbel Pollack PA-C

## 2020-02-21 ENCOUNTER — LAB (OUTPATIENT)
Dept: LAB | Facility: HOSPITAL | Age: 70
End: 2020-02-21

## 2020-02-21 DIAGNOSIS — I47.1 PAROXYSMAL SVT (SUPRAVENTRICULAR TACHYCARDIA) (HCC): ICD-10-CM

## 2020-02-21 PROCEDURE — 83735 ASSAY OF MAGNESIUM: CPT

## 2020-02-21 PROCEDURE — 36415 COLL VENOUS BLD VENIPUNCTURE: CPT

## 2020-02-22 LAB — MAGNESIUM SERPL-MCNC: 2.1 MG/DL (ref 1.6–2.4)

## 2020-03-12 ENCOUNTER — TREATMENT (OUTPATIENT)
Dept: CARDIOLOGY | Facility: CLINIC | Age: 70
End: 2020-03-12

## 2020-03-12 DIAGNOSIS — I49.5 SSS (SICK SINUS SYNDROME) (HCC): Primary | ICD-10-CM

## 2020-03-12 PROCEDURE — 93294 REM INTERROG EVL PM/LDLS PM: CPT | Performed by: INTERNAL MEDICINE

## 2020-03-12 PROCEDURE — 93296 REM INTERROG EVL PM/IDS: CPT | Performed by: INTERNAL MEDICINE

## 2020-06-11 ENCOUNTER — CLINICAL SUPPORT (OUTPATIENT)
Dept: CARDIOLOGY | Facility: CLINIC | Age: 70
End: 2020-06-11

## 2020-06-11 DIAGNOSIS — I49.5 SSS (SICK SINUS SYNDROME) (HCC): Primary | ICD-10-CM

## 2020-06-11 PROCEDURE — 93288 INTERROG EVL PM/LDLS PM IP: CPT | Performed by: INTERNAL MEDICINE

## 2020-08-18 ENCOUNTER — OFFICE VISIT (OUTPATIENT)
Dept: CARDIOLOGY | Facility: CLINIC | Age: 70
End: 2020-08-18

## 2020-08-18 VITALS
BODY MASS INDEX: 26.45 KG/M2 | TEMPERATURE: 97.9 F | SYSTOLIC BLOOD PRESSURE: 122 MMHG | DIASTOLIC BLOOD PRESSURE: 77 MMHG | HEIGHT: 69 IN | HEART RATE: 81 BPM | WEIGHT: 178.6 LBS

## 2020-08-18 DIAGNOSIS — I47.1 PAROXYSMAL SVT (SUPRAVENTRICULAR TACHYCARDIA) (HCC): ICD-10-CM

## 2020-08-18 DIAGNOSIS — E78.5 DYSLIPIDEMIA: ICD-10-CM

## 2020-08-18 DIAGNOSIS — I49.5 SSS (SICK SINUS SYNDROME) (HCC): Primary | ICD-10-CM

## 2020-08-18 PROCEDURE — 99213 OFFICE O/P EST LOW 20 MIN: CPT | Performed by: PHYSICIAN ASSISTANT

## 2020-08-18 PROCEDURE — 93000 ELECTROCARDIOGRAM COMPLETE: CPT | Performed by: PHYSICIAN ASSISTANT

## 2020-08-18 NOTE — PROGRESS NOTES
Tripp Núñez MD  Marvin Mendoza  1950 08/18/2020    Patient Active Problem List   Diagnosis   • SSS (sick sinus syndrome), s/p pacemaker placement 8/2009.   • Dyslipidemia   • Paroxysmal SVT (supraventricular tachycardia) (CMS/[facility])       Dear Tripp Núñez MD:    Subjective     History of Present Illness:    Chief Complaint   Patient presents with   • Follow-up     ROUTINE   • Med Management     LIST   • Dizziness     INTERMINTENT       Marvin Mendoza is a pleasant 70 y.o. male with a past medical history significant for  sick sinus syndrome, status post permanent dual-chamber pacemaker implantation initially in 2009 and then had a battery change in March 2015 with a [employer] medical device.  He comes in for routine cardiology follow-up.     Mr. Wong reports that he has been doing well with no complaints.  He does deny any chest pains, shortness of breath, dizziness, or syncope.  He reports that his blood pressure is routinely monitored at home as well and has been well controlled.    No Known Allergies:      Current Outpatient Medications:   •  Acetaminophen (ARTHRITIS PAIN RELIEF PO), Take 650 mg by mouth As Needed., Disp: , Rfl:   •  aspirin 81 MG EC tablet, Take 1 tablet by mouth Daily., Disp: 90 tablet, Rfl: 3  •  cetirizine (zyrTEC) 10 MG tablet, Take 10 mg by mouth Daily., Disp: , Rfl:   •  flecainide (TAMBOCOR) 100 MG tablet, Take 1 tablet by mouth Every 12 (Twelve) Hours., Disp: 180 tablet, Rfl: 2  •  metoprolol tartrate (LOPRESSOR) 25 MG tablet, Take 0.5 tablets by mouth Every 12 (Twelve) Hours., Disp: 90 tablet, Rfl: 2  •  Multiple Vitamin (MULTI-VITAMIN DAILY PO), Take  by mouth., Disp: , Rfl:   •  pravastatin (PRAVACHOL) 40 MG tablet, Take 1 tablet by mouth Daily., Disp: 90 tablet, Rfl: 2  •  sildenafil (VIAGRA) 100 MG tablet, Take 1 tablet by mouth As Needed for erectile dysfunction., Disp: 30 tablet, Rfl: 3  •  tamsulosin (FLOMAX) 0.4 MG capsule 24 hr capsule, Take 1 capsule by  "mouth Every Night., Disp: , Rfl:     The following portions of the patient's history were reviewed and updated as appropriate: allergies, current medications, past family history, past medical history, past social history, past surgical history and problem list.    Social History     Tobacco Use   • Smoking status: Never Smoker   • Smokeless tobacco: Never Used   Substance Use Topics   • Alcohol use: Yes     Comment: hx socially,occasionally   • Drug use: No       Review of Systems   Constitution: Negative for malaise/fatigue.   Cardiovascular: Negative for chest pain, dyspnea on exertion and irregular heartbeat.   Respiratory: Negative for cough and shortness of breath.    Hematologic/Lymphatic: Negative for bleeding problem. Does not bruise/bleed easily.   Gastrointestinal: Negative for nausea and vomiting.   Neurological: Negative for weakness.       Objective   Vitals:    08/18/20 0920   BP: 122/77   Pulse: 81   Temp: 97.9 °F (36.6 °C)   Weight: 81 kg (178 lb 9.6 oz)   Height: 175.3 cm (69\")     Body mass index is 26.37 kg/m².    Physical Exam   Constitutional: He is oriented to person, place, and time. He appears well-developed and well-nourished. No distress.   HENT:   Head: Normocephalic and atraumatic.   Cardiovascular: Normal rate, regular rhythm and normal heart sounds.   Pulmonary/Chest: Effort normal and breath sounds normal. No respiratory distress.   Musculoskeletal: He exhibits no edema.   Neurological: He is alert and oriented to person, place, and time.   Skin: He is not diaphoretic.       Lab Results   Component Value Date     02/28/2018    K 4.3 02/28/2018     02/28/2018    CO2 29.1 02/28/2018    BUN 14 02/28/2018    CREATININE 0.91 02/28/2018    GLUCOSE 82 02/28/2018    CALCIUM 8.9 02/28/2018    AST 32 09/08/2015    ALT 40 09/08/2015    ALKPHOS 96 09/08/2015    LABIL2 1.4 (L) 09/08/2015     No results found for: CKTOTAL  Lab Results   Component Value Date    WBC 5.0 03/17/2015    HGB " 14.1 2015    HCT 42.4 2015     2015     Lab Results   Component Value Date    INR 0.96 2015     Lab Results   Component Value Date    MG 2.1 2020     Lab Results   Component Value Date    CHLPL 204 (H) 2015    TRIG 30 2016    HDL 65 2016    LDL 75 2016      No results found for: BNP    During this visit the following were done:  Labs Reviewed [x]    Labs Ordered []    Radiology Reports Reviewed [x]    Radiology Ordered []    PCP Records Reviewed []    Referring Provider Records Reviewed []    ER Records Reviewed []    Hospital Records Reviewed []    History Obtained From Family []    Radiology Images Reviewed []    Other Reviewed []    Records Requested []         ECG 12 Lead  Date/Time: 2020 9:18 AM  Performed by: Charbel Pollack PA-C  Authorized by: Charbel Pollack PA-C   Comparison: compared with previous ECG   Similar to previous ECG  Rhythm: sinus rhythm  Pacin% capture and dual chamber pacing  Clinical impression: normal ECG  Comments:             Assessment/Plan    Diagnosis Plan   1. SSS (sick sinus syndrome), s/p pacemaker placement 2009.     2. Paroxysmal SVT (supraventricular tachycardia) (CMS/[facility])     3. Dyslipidemia              Recommendations:  1. Overall patient does appear to be stable from cardiac standpoint.  I did recommend him to schedule follow-up with electrophysiology as it has been greater than 6 months since he was last seen.  2. I will continue metoprolol tartrate, pravastatin, flecainide, and aspirin.      Return in about 6 months (around 2021).    As always, I appreciate very much the opportunity to participate in the cardiovascular care of your patients.      With Best Regards,    Charbel Pollack PA-C

## 2020-09-24 ENCOUNTER — TREATMENT (OUTPATIENT)
Dept: CARDIOLOGY | Facility: CLINIC | Age: 70
End: 2020-09-24

## 2020-09-24 DIAGNOSIS — I49.5 SSS (SICK SINUS SYNDROME) (HCC): Primary | ICD-10-CM

## 2020-09-24 PROCEDURE — 93296 REM INTERROG EVL PM/IDS: CPT | Performed by: INTERNAL MEDICINE

## 2020-09-24 PROCEDURE — 93294 REM INTERROG EVL PM/LDLS PM: CPT | Performed by: INTERNAL MEDICINE

## 2020-12-24 ENCOUNTER — TREATMENT (OUTPATIENT)
Dept: CARDIOLOGY | Facility: CLINIC | Age: 70
End: 2020-12-24

## 2020-12-24 DIAGNOSIS — I49.5 SSS (SICK SINUS SYNDROME) (HCC): Primary | ICD-10-CM

## 2020-12-24 PROCEDURE — 93294 REM INTERROG EVL PM/LDLS PM: CPT | Performed by: INTERNAL MEDICINE

## 2020-12-24 PROCEDURE — 93296 REM INTERROG EVL PM/IDS: CPT | Performed by: INTERNAL MEDICINE

## 2021-04-23 ENCOUNTER — TREATMENT (OUTPATIENT)
Dept: CARDIOLOGY | Facility: CLINIC | Age: 71
End: 2021-04-23

## 2021-04-23 DIAGNOSIS — I49.5 SSS (SICK SINUS SYNDROME) (HCC): Primary | ICD-10-CM

## 2021-04-23 PROCEDURE — 93294 REM INTERROG EVL PM/LDLS PM: CPT | Performed by: INTERNAL MEDICINE

## 2021-04-23 PROCEDURE — 93296 REM INTERROG EVL PM/IDS: CPT | Performed by: INTERNAL MEDICINE

## 2021-05-26 ENCOUNTER — OFFICE VISIT (OUTPATIENT)
Dept: CARDIOLOGY | Facility: CLINIC | Age: 71
End: 2021-05-26

## 2021-05-26 VITALS
DIASTOLIC BLOOD PRESSURE: 72 MMHG | HEART RATE: 65 BPM | SYSTOLIC BLOOD PRESSURE: 112 MMHG | WEIGHT: 171.6 LBS | TEMPERATURE: 97.1 F | HEIGHT: 69 IN | BODY MASS INDEX: 25.42 KG/M2 | OXYGEN SATURATION: 98 %

## 2021-05-26 DIAGNOSIS — E78.5 DYSLIPIDEMIA: ICD-10-CM

## 2021-05-26 DIAGNOSIS — I49.5 SSS (SICK SINUS SYNDROME) (HCC): Primary | ICD-10-CM

## 2021-05-26 DIAGNOSIS — I47.1 PAROXYSMAL SVT (SUPRAVENTRICULAR TACHYCARDIA) (HCC): ICD-10-CM

## 2021-05-26 PROCEDURE — 93000 ELECTROCARDIOGRAM COMPLETE: CPT | Performed by: PHYSICIAN ASSISTANT

## 2021-05-26 PROCEDURE — 99213 OFFICE O/P EST LOW 20 MIN: CPT | Performed by: PHYSICIAN ASSISTANT

## 2021-05-26 NOTE — PROGRESS NOTES
Tripp Núñez MD  Marvin Mendoza  1950 05/26/2021    Patient Active Problem List   Diagnosis   • SSS (sick sinus syndrome), s/p pacemaker placement 8/2009.   • Dyslipidemia   • Paroxysmal SVT (supraventricular tachycardia) (CMS/[facility])       Dear Tripp Núñez MD:    Subjective     History of Present Illness:    Chief Complaint   Patient presents with   • Follow-up     Routine   • Med Management     med list       Marvin Mendoza is a pleasant 70 y.o. male with a past medical history significant for sick sinus syndrome, status post permanent dual-chamber pacemaker implantation initially in 2009 and then had a battery change in March 2015 with a [employer] medical device.  He comes in for routine cardiology follow-up    Mr. Mendoza reports that he has been doing well without any issues with open questions. Patient denies any chest pain, shortness of breath, palpitations, dizziness, syncope or near syncope.     No Known Allergies:      Current Outpatient Medications:   •  Acetaminophen (ARTHRITIS PAIN RELIEF PO), Take 650 mg by mouth As Needed., Disp: , Rfl:   •  aspirin 81 MG EC tablet, Take 1 tablet by mouth Daily., Disp: 90 tablet, Rfl: 3  •  cetirizine (zyrTEC) 10 MG tablet, Take 10 mg by mouth Daily., Disp: , Rfl:   •  flecainide (TAMBOCOR) 100 MG tablet, Take 1 tablet by mouth Every 12 (Twelve) Hours., Disp: 180 tablet, Rfl: 2  •  metoprolol tartrate (LOPRESSOR) 25 MG tablet, Take 0.5 tablets by mouth Every 12 (Twelve) Hours., Disp: 90 tablet, Rfl: 2  •  Multiple Vitamin (MULTI-VITAMIN DAILY PO), Take  by mouth., Disp: , Rfl:   •  pravastatin (PRAVACHOL) 40 MG tablet, Take 1 tablet by mouth Daily., Disp: 90 tablet, Rfl: 2  •  sildenafil (VIAGRA) 100 MG tablet, Take 1 tablet by mouth As Needed for erectile dysfunction., Disp: 30 tablet, Rfl: 3  •  tamsulosin (FLOMAX) 0.4 MG capsule 24 hr capsule, Take 1 capsule by mouth Every Night., Disp: , Rfl:     The following portions of the patient's history  "were reviewed and updated as appropriate: allergies, current medications, past family history, past medical history, past social history, past surgical history and problem list.    Social History     Tobacco Use   • Smoking status: Never Smoker   • Smokeless tobacco: Never Used   Substance Use Topics   • Alcohol use: Yes     Comment: hx socially,occasionally   • Drug use: No         Objective   Vitals:    05/26/21 0819   BP: 112/72   BP Location: Left arm   Patient Position: Sitting   Cuff Size: Adult   Pulse: 65   Temp: 97.1 °F (36.2 °C)   TempSrc: Infrared   SpO2: 98%   Weight: 77.8 kg (171 lb 9.6 oz)   Height: 175.3 cm (69\")     Body mass index is 25.34 kg/m².    Constitutional:       General: Not in acute distress.     Appearance: Healthy appearance. Well-developed and not in distress. Not diaphoretic.   Eyes:      Conjunctiva/sclera: Conjunctivae normal.      Pupils: Pupils are equal, round, and reactive to light.   HENT:      Head: Normocephalic and atraumatic.   Neck:      Vascular: No carotid bruit or JVD.   Pulmonary:      Effort: Pulmonary effort is normal. No respiratory distress.      Breath sounds: Normal breath sounds.   Cardiovascular:      Normal rate. Regular rhythm.   Skin:     General: Skin is cool.   Neurological:      Mental Status: Alert, oriented to person, place, and time and oriented to person, place and time.         Lab Results   Component Value Date     02/28/2018    K 4.3 02/28/2018     02/28/2018    CO2 29.1 02/28/2018    BUN 14 02/28/2018    CREATININE 0.91 02/28/2018    GLUCOSE 82 02/28/2018    CALCIUM 8.9 02/28/2018    AST 32 09/08/2015    ALT 40 09/08/2015    ALKPHOS 96 09/08/2015    LABIL2 1.4 (L) 09/08/2015     No results found for: CKTOTAL  Lab Results   Component Value Date    WBC 5.0 03/17/2015    HGB 14.1 03/17/2015    HCT 42.4 03/17/2015     03/17/2015     Lab Results   Component Value Date    INR 0.96 03/17/2015     Lab Results   Component Value Date    MG " 2.1 02/21/2020     Lab Results   Component Value Date    CHLPL 204 (H) 09/08/2015    TRIG 30 06/21/2016    HDL 65 06/21/2016    LDL 75 06/21/2016      No results found for: BNP    During this visit the following were done:  Labs Reviewed [x]    Labs Ordered []    Radiology Reports Reviewed []    Radiology Ordered []    PCP Records Reviewed []    Referring Provider Records Reviewed []    ER Records Reviewed []    Hospital Records Reviewed []    History Obtained From Family []    Radiology Images Reviewed []    Other Reviewed []    Records Requested []         ECG 12 Lead    Date/Time: 5/26/2021 8:15 AM  Performed by: Charbel Pollack PA-C  Authorized by: Charbel Pollack PA-C   Comparison: compared with previous ECG   Similar to previous ECG  Rhythm: sinus rhythm  Conduction: conduction normal  Pacing: dual chamber pacing and 100% capture  Clinical impression: normal ECG            Assessment/Plan    Diagnosis Plan   1. SSS (sick sinus syndrome), s/p pacemaker placement 8/2009.     2. Paroxysmal SVT (supraventricular tachycardia) (CMS/[facility])     3. Dyslipidemia              Recommendations:  1. Sick sinus syndrome status post permanent pacemaker implantation  1. Doing well clinically denies any symptoms of tachycardia or palpitations.  2. Paroxysmal SVT  1. Again patient asymptomatic we will continue current therapy of flecainide and metoprolol.      Return in about 6 months (around 11/26/2021).    As always, I appreciate very much the opportunity to participate in the cardiovascular care of your patients.      With Best Regards,    Charbel Pollack PA-C

## 2021-10-22 ENCOUNTER — TREATMENT (OUTPATIENT)
Dept: CARDIOLOGY | Facility: CLINIC | Age: 71
End: 2021-10-22

## 2021-10-22 DIAGNOSIS — I49.5 SSS (SICK SINUS SYNDROME) (HCC): Primary | ICD-10-CM

## 2021-10-22 PROCEDURE — 93294 REM INTERROG EVL PM/LDLS PM: CPT | Performed by: INTERNAL MEDICINE

## 2021-10-22 PROCEDURE — 93296 REM INTERROG EVL PM/IDS: CPT | Performed by: INTERNAL MEDICINE

## 2021-11-11 ENCOUNTER — CLINICAL SUPPORT (OUTPATIENT)
Dept: CARDIOLOGY | Facility: CLINIC | Age: 71
End: 2021-11-11

## 2021-11-11 DIAGNOSIS — I49.5 SSS (SICK SINUS SYNDROME) (HCC): Primary | ICD-10-CM

## 2021-11-11 PROCEDURE — 93280 PM DEVICE PROGR EVAL DUAL: CPT | Performed by: INTERNAL MEDICINE

## 2021-11-17 ENCOUNTER — OFFICE VISIT (OUTPATIENT)
Dept: CARDIOLOGY | Facility: CLINIC | Age: 71
End: 2021-11-17

## 2021-11-17 VITALS
DIASTOLIC BLOOD PRESSURE: 72 MMHG | SYSTOLIC BLOOD PRESSURE: 111 MMHG | BODY MASS INDEX: 25.71 KG/M2 | TEMPERATURE: 97.1 F | HEIGHT: 69 IN | WEIGHT: 173.6 LBS | HEART RATE: 71 BPM

## 2021-11-17 DIAGNOSIS — I49.5 SSS (SICK SINUS SYNDROME) (HCC): Primary | ICD-10-CM

## 2021-11-17 DIAGNOSIS — I47.1 PAROXYSMAL SVT (SUPRAVENTRICULAR TACHYCARDIA) (HCC): ICD-10-CM

## 2021-11-17 DIAGNOSIS — E78.5 DYSLIPIDEMIA: ICD-10-CM

## 2021-11-17 PROCEDURE — 99213 OFFICE O/P EST LOW 20 MIN: CPT | Performed by: PHYSICIAN ASSISTANT

## 2021-11-17 PROCEDURE — 93000 ELECTROCARDIOGRAM COMPLETE: CPT | Performed by: PHYSICIAN ASSISTANT

## 2021-11-17 NOTE — PROGRESS NOTES
Tripp Núñez MD  Marvin Mendoza  1950 11/17/2021    Patient Active Problem List   Diagnosis   • SSS (sick sinus syndrome), s/p pacemaker placement 8/2009.   • Dyslipidemia   • Paroxysmal SVT (supraventricular tachycardia) (HCC)       Dear Tripp Núñez MD:    Subjective     History of Present Illness:    Chief Complaint   Patient presents with   • Follow-up     routine   • Med Management     pt provided list       Marvin Mendoza is a pleasant 71 y.o. male with a past medical history significant for sick sinus syndrome, status post permanent dual-chamber pacemaker implantation initially in 2009 and then had a battery change in March 2015 with a [employer] medical device.  He comes in for routine cardiology follow-up     Marvin reports that he has been doing well since he was last seen and has no complaints to bring forth on open questions. Patient denies any chest pain, shortness of breath, palpitations, dizziness, syncope or near syncope. He does report that he has been on flecainide now for several months apparently there was an issue with insurance no longer approving this. Thankfully, he does deny any known episodes of SVT or palpitations.     No Known Allergies:      Current Outpatient Medications:   •  Acetaminophen (ARTHRITIS PAIN RELIEF PO), Take 650 mg by mouth As Needed., Disp: , Rfl:   •  aspirin 81 MG EC tablet, Take 1 tablet by mouth Daily., Disp: 90 tablet, Rfl: 3  •  cetirizine (zyrTEC) 10 MG tablet, Take 10 mg by mouth Daily., Disp: , Rfl:   •  metoprolol tartrate (LOPRESSOR) 25 MG tablet, Take 0.5 tablets by mouth Every 12 (Twelve) Hours., Disp: 90 tablet, Rfl: 2  •  Multiple Vitamin (MULTI-VITAMIN DAILY PO), Take  by mouth., Disp: , Rfl:   •  pravastatin (PRAVACHOL) 40 MG tablet, Take 1 tablet by mouth Daily., Disp: 90 tablet, Rfl: 2  •  sildenafil (VIAGRA) 100 MG tablet, Take 1 tablet by mouth As Needed for erectile dysfunction., Disp: 30 tablet, Rfl: 3  •  tamsulosin (FLOMAX)  "0.4 MG capsule 24 hr capsule, Take 1 capsule by mouth Every Night., Disp: , Rfl:     The following portions of the patient's history were reviewed and updated as appropriate: allergies, current medications, past family history, past medical history, past social history, past surgical history and problem list.    Social History     Tobacco Use   • Smoking status: Never Smoker   • Smokeless tobacco: Never Used   Substance Use Topics   • Alcohol use: Yes     Comment: hx socially,occasionally   • Drug use: No         Objective   Vitals:    11/17/21 0822   BP: 111/72   Pulse: 71   Temp: 97.1 °F (36.2 °C)   Weight: 78.7 kg (173 lb 9.6 oz)   Height: 175.3 cm (69.02\")     Body mass index is 25.62 kg/m².    Constitutional:       General: Not in acute distress.     Appearance: Healthy appearance. Well-developed and not in distress. Not diaphoretic.   Eyes:      Conjunctiva/sclera: Conjunctivae normal.      Pupils: Pupils are equal, round, and reactive to light.   HENT:      Head: Normocephalic and atraumatic.   Neck:      Vascular: No carotid bruit or JVD.   Pulmonary:      Effort: Pulmonary effort is normal. No respiratory distress.      Breath sounds: Normal breath sounds.   Cardiovascular:      Normal rate. Regular rhythm.   Skin:     General: Skin is cool.   Neurological:      Mental Status: Alert, oriented to person, place, and time and oriented to person, place and time.         Lab Results   Component Value Date     02/28/2018    K 4.3 02/28/2018     02/28/2018    CO2 29.1 02/28/2018    BUN 14 02/28/2018    CREATININE 0.91 02/28/2018    GLUCOSE 82 02/28/2018    CALCIUM 8.9 02/28/2018    AST 32 09/08/2015    ALT 40 09/08/2015    ALKPHOS 96 09/08/2015    LABIL2 1.4 (L) 09/08/2015     No results found for: CKTOTAL  Lab Results   Component Value Date    WBC 5.0 03/17/2015    HGB 14.1 03/17/2015    HCT 42.4 03/17/2015     03/17/2015     Lab Results   Component Value Date    INR 0.96 03/17/2015     Lab " Results   Component Value Date    MG 2.1 02/21/2020     Lab Results   Component Value Date    CHLPL 204 (H) 09/08/2015    TRIG 30 06/21/2016    HDL 65 06/21/2016    LDL 75 06/21/2016      No results found for: BNP    During this visit the following were done:  Labs Reviewed []    Labs Ordered []    Radiology Reports Reviewed []    Radiology Ordered []    PCP Records Reviewed []    Referring Provider Records Reviewed []    ER Records Reviewed []    Hospital Records Reviewed []    History Obtained From Family []    Radiology Images Reviewed []    Other Reviewed []    Records Requested []         ECG 12 Lead    Date/Time: 11/17/2021 8:26 AM  Performed by: Charbel Pollack PA-C  Authorized by: Charbel Pollack PA-C   Comparison: compared with previous ECG   Similar to previous ECG  Rhythm: sinus rhythm  Conduction: conduction normal  ST Segments: ST segments normal  Pacing: atrial sensed rhythm, ventricular paced rhythm and 100% capture  Clinical impression: normal ECG  Comments: Patient pacemaker dependent and is atrial and ventricular paced with appropriate sensing on magnet application            Assessment/Plan    Diagnosis Plan   1. SSS (sick sinus syndrome), s/p pacemaker placement 8/2009.     2. Dyslipidemia     3. Paroxysmal SVT (supraventricular tachycardia) ([facility])              Recommendations:  1. Sick sinus syndrome status post permanent pacemaker implantation  1. Doing well clinically with appropriate sensing patient appears to be pacemaker dependent.  We will continue with routine surveillance of pacemaker  2. SVT  1. Denies any known episodes unfortunately has not been on flecainide now for several months thankfully still asymptomatic stable just monitor for now.      Return in about 6 months (around 5/17/2022).    As always, I appreciate very much the opportunity to participate in the cardiovascular care of your patients.      With Best Regards,    Charbel Pollack PA-C

## 2021-11-29 ENCOUNTER — TELEPHONE (OUTPATIENT)
Dept: CARDIOLOGY | Facility: CLINIC | Age: 71
End: 2021-11-29

## 2021-11-29 NOTE — TELEPHONE ENCOUNTER
----- Message from Charbel Pollack PA-C sent at 11/17/2021  8:51 AM EST -----  Can we get recent labs from pcp?

## 2022-01-04 ENCOUNTER — TELEPHONE (OUTPATIENT)
Dept: CARDIOLOGY | Facility: CLINIC | Age: 72
End: 2022-01-04

## 2022-01-05 NOTE — TELEPHONE ENCOUNTER
I called and spoke to pts wife and she states that she thought he was suppose to be taking 25mg twice a day now instead of 12.5mg twice daily. Please advise

## 2022-01-13 NOTE — TELEPHONE ENCOUNTER
So long as heart rate is >60 and BP is >100 mmHg systolic he can take 25 mg otherwise reduce to 12.5

## 2022-01-20 ENCOUNTER — TREATMENT (OUTPATIENT)
Dept: CARDIOLOGY | Facility: CLINIC | Age: 72
End: 2022-01-20

## 2022-01-20 DIAGNOSIS — I49.5 SSS (SICK SINUS SYNDROME): Primary | ICD-10-CM

## 2022-01-20 PROCEDURE — 93294 REM INTERROG EVL PM/LDLS PM: CPT | Performed by: INTERNAL MEDICINE

## 2022-01-20 PROCEDURE — 93296 REM INTERROG EVL PM/IDS: CPT | Performed by: INTERNAL MEDICINE

## 2022-05-17 ENCOUNTER — OFFICE VISIT (OUTPATIENT)
Dept: CARDIOLOGY | Facility: CLINIC | Age: 72
End: 2022-05-17

## 2022-05-17 VITALS
HEART RATE: 67 BPM | BODY MASS INDEX: 26.84 KG/M2 | SYSTOLIC BLOOD PRESSURE: 123 MMHG | WEIGHT: 181.2 LBS | TEMPERATURE: 97.1 F | DIASTOLIC BLOOD PRESSURE: 79 MMHG | HEIGHT: 69 IN | OXYGEN SATURATION: 99 %

## 2022-05-17 DIAGNOSIS — E78.5 DYSLIPIDEMIA: ICD-10-CM

## 2022-05-17 DIAGNOSIS — I49.5 SSS (SICK SINUS SYNDROME): Primary | ICD-10-CM

## 2022-05-17 DIAGNOSIS — I47.1 PAROXYSMAL SVT (SUPRAVENTRICULAR TACHYCARDIA): ICD-10-CM

## 2022-05-17 PROCEDURE — 93000 ELECTROCARDIOGRAM COMPLETE: CPT | Performed by: PHYSICIAN ASSISTANT

## 2022-05-17 PROCEDURE — 99213 OFFICE O/P EST LOW 20 MIN: CPT | Performed by: PHYSICIAN ASSISTANT

## 2022-05-17 RX ORDER — CEPHRADINE 500 MG
CAPSULE ORAL
COMMUNITY

## 2022-05-17 RX ORDER — PHENOL 1.4 %
600 AEROSOL, SPRAY (ML) MUCOUS MEMBRANE DAILY
COMMUNITY

## 2022-05-17 RX ORDER — CHOLECALCIFEROL (VITAMIN D3) 125 MCG
500 CAPSULE ORAL DAILY
COMMUNITY

## 2022-05-17 RX ORDER — DILTIAZEM HYDROCHLORIDE 180 MG/1
180 CAPSULE, COATED, EXTENDED RELEASE ORAL DAILY
COMMUNITY

## 2022-05-17 RX ORDER — MAG HYDROX/ALUMINUM HYD/SIMETH 400-400-40
450 SUSPENSION, ORAL (FINAL DOSE FORM) ORAL
COMMUNITY

## 2022-05-17 RX ORDER — BORON CITRATE 3 MG
3 TABLET ORAL
COMMUNITY

## 2022-05-17 RX ORDER — MULTIVIT WITH MINERALS/LUTEIN
1000 TABLET ORAL DAILY
COMMUNITY

## 2022-05-17 NOTE — PROGRESS NOTES
Tripp Núñez MD  Marvin Mendoza  1950 05/17/2022    Patient Active Problem List   Diagnosis   • SSS (sick sinus syndrome), s/p pacemaker placement 8/2009.   • Dyslipidemia   • Paroxysmal SVT (supraventricular tachycardia) ([facility])       Dear Tripp Núñez MD:    Subjective     History of Present Illness:    Chief Complaint   Patient presents with   • Follow-up     6 month   • Med Management     list       Marvin Mendoza is a pleasant 71 y.o. male with a past medical history significant for sick sinus syndrome, status post permanent dual-chamber pacemaker implantation initially in 2009 and then had a battery change in March 2015 with a [employer] medical device.  He does deny history of coronary artery disease, diabetes mellitus, or tobacco abuse.  He comes in for routine cardiology follow-up     He states he has done well since he was last seen has no complaints with open questions.  Upon further questioning he still denies any palpitations, tachycardia, syncope, or near syncope.  He also denies any shortness of breath or chest pains.    No Known Allergies:      Current Outpatient Medications:   •  Acetaminophen (ARTHRITIS PAIN RELIEF PO), Take 650 mg by mouth As Needed., Disp: , Rfl:   •  aspirin 81 MG EC tablet, Take 1 tablet by mouth Daily., Disp: 90 tablet, Rfl: 3  •  Boron 3 MG capsule, Take 3 mg by mouth., Disp: , Rfl:   •  calcium carbonate (OS-PAGE) 600 MG tablet, Take 600 mg by mouth Daily., Disp: , Rfl:   •  Cranberry 1000 MG capsule, Take 25,000 mg by mouth., Disp: , Rfl:   •  dilTIAZem CD (CARDIZEM CD) 180 MG 24 hr capsule, Take 180 mg by mouth Daily., Disp: , Rfl:   •  Glucosamine-Chondroitin (OSTEO BI-FLEX REGULAR STRENGTH PO), Take  by mouth., Disp: , Rfl:   •  L-Arginine 1000 MG tablet, Take  by mouth., Disp: , Rfl:   •  metoprolol tartrate (LOPRESSOR) 25 MG tablet, Take 0.5 tablets by mouth Every 12 (Twelve) Hours., Disp: 90 tablet, Rfl: 2  •  Multiple Vitamin (MULTI-VITAMIN DAILY  "PO), Take  by mouth., Disp: , Rfl:   •  pravastatin (PRAVACHOL) 40 MG tablet, Take 1 tablet by mouth Daily., Disp: 90 tablet, Rfl: 2  •  Saw Palmetto 450 MG capsule, Take 450 mg by mouth., Disp: , Rfl:   •  sildenafil (VIAGRA) 100 MG tablet, Take 1 tablet by mouth As Needed for erectile dysfunction., Disp: 30 tablet, Rfl: 3  •  tamsulosin (FLOMAX) 0.4 MG capsule 24 hr capsule, Take 1 capsule by mouth Every Night., Disp: , Rfl:   •  vitamin B-12 (CYANOCOBALAMIN) 500 MCG tablet, Take 500 mcg by mouth Daily., Disp: , Rfl:   •  vitamin C (ASCORBIC ACID) 250 MG tablet, Take 1,000 mg by mouth Daily., Disp: , Rfl:   •  vitamin D3 125 MCG (5000 UT) capsule capsule, Take 5,000 Units by mouth Daily., Disp: , Rfl:   •  cetirizine (zyrTEC) 10 MG tablet, Take 10 mg by mouth Daily., Disp: , Rfl:     The following portions of the patient's history were reviewed and updated as appropriate: allergies, current medications, past family history, past medical history, past social history, past surgical history and problem list.    Social History     Tobacco Use   • Smoking status: Never Smoker   • Smokeless tobacco: Never Used   Substance Use Topics   • Alcohol use: Yes     Comment: hx socially,occasionally   • Drug use: No     Objective   Vitals:    05/17/22 0815   BP: 123/79   BP Location: Right arm   Patient Position: Sitting   Cuff Size: Adult   Pulse: 67   Temp: 97.1 °F (36.2 °C)   TempSrc: Temporal   SpO2: 99%   Weight: 82.2 kg (181 lb 3.2 oz)   Height: 175.3 cm (69.02\")     Body mass index is 26.74 kg/m².    Constitutional:       General: Not in acute distress.     Appearance: Healthy appearance. Well-developed and not in distress. Not diaphoretic.   Eyes:      Conjunctiva/sclera: Conjunctivae normal.      Pupils: Pupils are equal, round, and reactive to light.   HENT:      Head: Normocephalic and atraumatic.   Neck:      Vascular: No carotid bruit or JVD.   Pulmonary:      Effort: Pulmonary effort is normal. No respiratory " distress.      Breath sounds: Normal breath sounds.   Cardiovascular:      Normal rate. Regular rhythm.   Skin:     General: Skin is cool.   Neurological:      Mental Status: Alert, oriented to person, place, and time and oriented to person, place and time.         Lab Results   Component Value Date     02/28/2018    K 4.3 02/28/2018     02/28/2018    CO2 29.1 02/28/2018    BUN 14 02/28/2018    CREATININE 0.91 02/28/2018    GLUCOSE 82 02/28/2018    CALCIUM 8.9 02/28/2018    AST 32 09/08/2015    ALT 40 09/08/2015    ALKPHOS 96 09/08/2015    LABIL2 1.4 (L) 09/08/2015     No results found for: CKTOTAL  Lab Results   Component Value Date    WBC 5.0 03/17/2015    HGB 14.1 03/17/2015    HCT 42.4 03/17/2015     03/17/2015     Lab Results   Component Value Date    INR 0.96 03/17/2015     Lab Results   Component Value Date    MG 2.1 02/21/2020     Lab Results   Component Value Date    CHLPL 204 (H) 09/08/2015    TRIG 30 06/21/2016    HDL 65 06/21/2016    LDL 75 06/21/2016      No results found for: BNP    During this visit the following were done:  Labs Reviewed []    Labs Ordered []    Radiology Reports Reviewed []    Radiology Ordered []    PCP Records Reviewed []    Referring Provider Records Reviewed []    ER Records Reviewed []    Hospital Records Reviewed []    History Obtained From Family []    Radiology Images Reviewed []    Other Reviewed []    Records Requested []         ECG 12 Lead    Date/Time: 5/17/2022 8:06 AM  Performed by: Charbel Pollack PA-C  Authorized by: Charbel Pollack PA-C   Comparison: compared with previous ECG   Similar to previous ECG  Rhythm: sinus rhythm  Pacing: atrial sensed rhythm, ventricular paced rhythm and 100% capture  Clinical impression: normal ECG  Comments: Appropriate AV sensing upon magnet application.  Patient being atrial and ventricular paced.          Assessment & Plan    Diagnosis Plan   1. SSS (sick sinus syndrome), s/p pacemaker placement 8/2009.      2. Dyslipidemia     3. Paroxysmal SVT (supraventricular tachycardia) ([facility])              Recommendations:  1. Sick sinus syndrome status post permanent pacemaker implantation  1. Doing well clinically asymptomatic we will continue routine interrogations.  2. Paroxysmal SVT  1. Currently asymptomatic we will continue Lopressor and diltiazem we will tailor therapy accordingly if recurrent SVT is seen on pacemaker.      Return in about 1 year (around 5/17/2023).    As always, I appreciate very much the opportunity to participate in the cardiovascular care of your patients.      With Best Regards,    Charbel Pollack PA-C

## 2022-11-22 NOTE — PROGRESS NOTES
5                [facility]             [facility] DAVID Escobar Dicky Lhaden, MD  Marvin Mendoza  1950 11/23/2022    Patient Active Problem List   Diagnosis   • SSS (sick sinus syndrome), s/p pacemaker placement 8/2009.   • Dyslipidemia   • Paroxysmal SVT (supraventricular tachycardia) (HCC)       Dear Tripp Núñez MD:    Subjective     HPI:     This is a 72 y.o. male with known past medical history of sick sinus syndrome status post permanent dual-chamber pacemaker implantation in 2009 with generator change in March 2015 with a [employer] device, dyslipidemia and paroxysmal SVT.    Marvin Mendoza presents today for routine cardiology follow up.  Patient states has been doing overall well since his last visit.  Denies any chest pain, palpitations or shortness of breath.  Blood pressures been controlled.  [facility] around a month ago however this is not available for review at this time.  Last remote pacemaker monitoring showed battery life of 1.67 years with no acute events.    • Diagnostic Testing  1. Echocardiogram 3/2017: EF 61 to 65%  2. Echocardiogram 3/2018: EF 56 to 60%.       All other systems were reviewed and were negative.    Patient Active Problem List   Diagnosis   • SSS (sick sinus syndrome), s/p pacemaker placement 8/2009.   • Dyslipidemia   • Paroxysmal SVT (supraventricular tachycardia) (HCC)       family history includes Heart attack in his father; Other in his father.     reports that he has never smoked. He has never used smokeless tobacco. He reports current alcohol use. He reports that he does not use drugs.    No Known Allergies      Current Outpatient Medications:   •  Acetaminophen (ARTHRITIS PAIN RELIEF PO), Take 650 mg by mouth As Needed., Disp: , Rfl:   •  aspirin 81 MG EC tablet, Take 1 tablet by mouth Daily., Disp: 90 tablet, Rfl: 3  •  Boron 3 MG capsule, Take 3 mg by  mouth., Disp: , Rfl:   •  calcium carbonate (OS-PAGE) 600 MG tablet, Take 600 mg by mouth Daily., Disp: , Rfl:   •  cetirizine (zyrTEC) 10 MG tablet, Take 10 mg by mouth Daily., Disp: , Rfl:   •  Cranberry 1000 MG capsule, Take 25,000 mg by mouth., Disp: , Rfl:   •  dilTIAZem CD (CARDIZEM CD) 180 MG 24 hr capsule, Take 180 mg by mouth Daily., Disp: , Rfl:   •  Glucosamine-Chondroitin (OSTEO BI-FLEX REGULAR STRENGTH PO), Take  by mouth., Disp: , Rfl:   •  L-Arginine 1000 MG tablet, Take  by mouth., Disp: , Rfl:   •  Multiple Vitamin (MULTI-VITAMIN DAILY PO), Take  by mouth., Disp: , Rfl:   •  pravastatin (PRAVACHOL) 40 MG tablet, Take 1 tablet by mouth Daily., Disp: 90 tablet, Rfl: 2  •  Saw Palmetto 450 MG capsule, Take 450 mg by mouth., Disp: , Rfl:   •  sildenafil (VIAGRA) 100 MG tablet, Take 1 tablet by mouth As Needed for erectile dysfunction., Disp: 30 tablet, Rfl: 3  •  tamsulosin (FLOMAX) 0.4 MG capsule 24 hr capsule, Take 1 capsule by mouth Every Night., Disp: , Rfl:   •  vitamin B-12 (CYANOCOBALAMIN) 500 MCG tablet, Take 500 mcg by mouth Daily., Disp: , Rfl:   •  vitamin C (ASCORBIC ACID) 250 MG tablet, Take 1,000 mg by mouth Daily., Disp: , Rfl:   •  vitamin D3 125 MCG (5000 UT) capsule capsule, Take 5,000 Units by mouth Daily., Disp: , Rfl:   •  metoprolol succinate XL (TOPROL-XL) 50 MG 24 hr tablet, Take 1 tablet by mouth Daily., Disp: 30 tablet, Rfl: 6      Physical Exam:  I have reviewed the patient's current vital signs as documented in the patient's EMR.   Vitals:    11/23/22 0827   BP: 118/79   Pulse: 82     Body mass index is 25.84 kg/m².       11/23/22 0827   Weight: 79.4 kg (175 lb)      Physical Exam  Constitutional:       General: He is not in acute distress.     Appearance: Normal appearance. He is well-developed.   HENT:      Head: Normocephalic and atraumatic.      Mouth/Throat:      Mouth: Mucous membranes are moist.   Eyes:      Extraocular Movements: Extraocular movements intact.       Pupils: Pupils are equal, round, and reactive to light.   Neck:      Vascular: No JVD.   Cardiovascular:      Rate and Rhythm: Normal rate and regular rhythm.      Pulses:           Dorsalis pedis pulses are 2+ on the right side and 2+ on the left side.        Posterior tibial pulses are 2+ on the right side and 2+ on the left side.      Heart sounds: Normal heart sounds. No murmur heard.    No S3 or S4 sounds.   Pulmonary:      Effort: Pulmonary effort is normal. No respiratory distress.      Breath sounds: Normal breath sounds. No wheezing.   Abdominal:      General: Bowel sounds are normal. There is no distension.      Palpations: Abdomen is soft. There is no hepatomegaly.      Tenderness: There is no abdominal tenderness.   Musculoskeletal:         General: Normal range of motion.      Cervical back: Normal range of motion and neck supple.   Skin:     General: Skin is warm and dry.      Coloration: Skin is not jaundiced or pale.   Neurological:      General: No focal deficit present.      Mental Status: He is alert and oriented to person, place, and time. Mental status is at baseline.   Psychiatric:         Mood and Affect: Mood normal.         Behavior: Behavior normal.         Thought Content: Thought content normal.         Judgment: Judgment normal.            DATA REVIEWED:     TTE/AMISH:  Results for orders placed during the hospital encounter of 02/28/18    Adult Transthoracic Echo Complete W/ Cont if Necessary Per Protocol    Interpretation Summary  · Normal left ventricular cavity size and wall thickness noted. All left ventricular wall segments contract normally.  · Estimated EF appears to be in the range of 56 - 60%.  · The aortic valve is structurally normal. No aortic valve regurgitation is present. No aortic valve stenosis is present.  · The mitral valve is normal in structure. Mild mitral valve regurgitation is present. No significant mitral valve stenosis is present.  · The tricuspid valve is  normal. No tricuspid valve stenosis is present. No tricuspid valve regurgitation is present. Estimated right ventricular systolic pressure from tricuspid regurgitation is normal (<35 mmHg).  · There is no evidence of pericardial effusion.      Laboratory evaluations:    Lab Results   Component Value Date    GLUCOSE 82 02/28/2018    BUN 14 02/28/2018    CREATININE 0.91 02/28/2018    EGFRIFNONA 83 02/28/2018    BCR 15.4 02/28/2018    K 4.3 02/28/2018    CO2 29.1 02/28/2018    CALCIUM 8.9 02/28/2018    ALBUMIN 4.2 09/08/2015    LABIL2 1.4 (L) 09/08/2015    AST 32 09/08/2015    ALT 40 09/08/2015     Lab Results   Component Value Date    WBC 5.0 03/17/2015    HGB 14.1 03/17/2015    HCT 42.4 03/17/2015    MCV 93.4 03/17/2015     03/17/2015     Lab Results   Component Value Date    CHOL 146 06/21/2016    CHLPL 204 (H) 09/08/2015    TRIG 30 06/21/2016    HDL 65 06/21/2016    LDL 75 06/21/2016     No results found for: TSH, H6TRWSF, Y5BYTWD, THYROIDAB  No results found for: HGBA1C  Lab Results   Component Value Date    ALT 40 09/08/2015     No results found for: HGBA1C  Lab Results   Component Value Date    CREATININE 0.91 02/28/2018     No results found for: IRON, TIBC, FERRITIN  Lab Results   Component Value Date    INR 0.96 03/17/2015    PROTIME 10.6 03/17/2015           ECG 12 Lead    Date/Time: 11/23/2022 9:20 AM  Performed by: Cat Escobar APRN  Authorized by: Cat Escobar APRN   Rhythm: paced  Conduction: conduction normal  ST Segments: ST segments normal  T Waves: T waves normal    Clinical impression: non-specific ECG          --------------------------------------------------------------------------------------------------------------------------    ASSESSMENT/PLAN:      Diagnosis Plan   1. SSS (sick sinus syndrome), s/p pacemaker placement 8/2009.        2. Dyslipidemia            1. Sick sinus syndrome status post permanent pacemaker implantation  • Pacemaker remote monitoring from  August 2022 showed battery life of 1.67 years.  [facility] and states [facility] was recently [facility] at their office however this report is not available.  We will request recent interrogation for our records.  • Continue with Cardizem CD and metoprolol succinate 50 mg p.o. daily.    2. Dyslipidemia  • No recent lab work on file.  [facility] therefore will request these.  Continue with statin.      This document has been @Electronically signed by DAVID Ford, 11/22/22, 4:29 PM EST.       Dictated Utilizing Dragon Dictation: Part of this note may be an electronic transcription/translation of spoken language to printed text using the Dragon Dictation System.    Follow-up appointment and medication changes provided in hand delivered After Visit Summary as well as reviewed in the room.

## 2022-11-23 ENCOUNTER — OFFICE VISIT (OUTPATIENT)
Dept: CARDIOLOGY | Facility: CLINIC | Age: 72
End: 2022-11-23

## 2022-11-23 VITALS
DIASTOLIC BLOOD PRESSURE: 79 MMHG | SYSTOLIC BLOOD PRESSURE: 118 MMHG | BODY MASS INDEX: 25.92 KG/M2 | HEART RATE: 82 BPM | HEIGHT: 69 IN | WEIGHT: 175 LBS

## 2022-11-23 DIAGNOSIS — I49.5 SSS (SICK SINUS SYNDROME): Primary | ICD-10-CM

## 2022-11-23 DIAGNOSIS — E78.5 DYSLIPIDEMIA: ICD-10-CM

## 2022-11-23 PROCEDURE — 93000 ELECTROCARDIOGRAM COMPLETE: CPT | Performed by: NURSE PRACTITIONER

## 2022-11-23 PROCEDURE — 99213 OFFICE O/P EST LOW 20 MIN: CPT | Performed by: NURSE PRACTITIONER

## 2022-11-23 RX ORDER — METOPROLOL SUCCINATE 50 MG/1
50 TABLET, EXTENDED RELEASE ORAL DAILY
Qty: 30 TABLET | Refills: 6 | Status: SHIPPED | OUTPATIENT
Start: 2022-11-23

## 2022-12-01 ENCOUNTER — TELEPHONE (OUTPATIENT)
Dept: CARDIOLOGY | Facility: CLINIC | Age: 72
End: 2022-12-01

## 2022-12-01 NOTE — TELEPHONE ENCOUNTER
Hub can read.     Called pt to see where he had his pacer interrogation done at. No answer LM.       ----- Message from DAVID Posey sent at 11/23/2022  9:19 AM EST -----  Please request [facility]

## 2023-06-12 ENCOUNTER — OFFICE VISIT (OUTPATIENT)
Dept: CARDIOLOGY | Facility: CLINIC | Age: 73
End: 2023-06-12
Payer: MEDICARE

## 2023-06-12 VITALS
RESPIRATION RATE: 16 BRPM | BODY MASS INDEX: 26.03 KG/M2 | SYSTOLIC BLOOD PRESSURE: 121 MMHG | OXYGEN SATURATION: 98 % | WEIGHT: 181.8 LBS | HEIGHT: 70 IN | HEART RATE: 65 BPM | DIASTOLIC BLOOD PRESSURE: 81 MMHG

## 2023-06-12 DIAGNOSIS — E78.5 DYSLIPIDEMIA: ICD-10-CM

## 2023-06-12 DIAGNOSIS — I49.5 SSS (SICK SINUS SYNDROME): Primary | ICD-10-CM

## 2023-06-12 RX ORDER — TERAZOSIN 2 MG/1
2 CAPSULE ORAL NIGHTLY
COMMUNITY

## 2023-06-12 NOTE — PROGRESS NOTES
[facility]             [facility] DAVID Escobar Dicky Lhaden, MD  Marvin STUBBS Reji  1950 06/12/2023    Patient Active Problem List   Diagnosis    SSS (sick sinus syndrome), s/p pacemaker placement 8/2009.    Dyslipidemia    Paroxysmal SVT (supraventricular tachycardia)       Dear Tripp Núñez MD:    Subjective     Chief Complaint   Patient presents with    Follow-up     6 mos     Med Management     List provoided       HPI:     This is a 72 y.o. male with known past medical history of sick sinus syndrome status post permanent dual-chamber pacemaker implantation in 2009 with generator change in March 2015 with a [employer] device, dyslipidemia and paroxysmal SVT.     Marvin Mendoza presents today for routine cardiology follow up.  Patient states he has been doing overall well since his last visit.  [facility] per his report which showed proper function with a low burden of paroxysmal atrial fibrillation in 1 year battery life.  No significant changes were made at that time.  Patient denies any chest pain, shortness of breath, palpitations or lower extremity edema.  Denies any recent lab work.  Ports compliance with medications.    Diagnostic Testing  Echocardiogram 3/2017: EF 61 to 65%  Echocardiogram 3/2018: EF 56 to 60%.     All other systems were reviewed and were negative.    Patient Active Problem List   Diagnosis    SSS (sick sinus syndrome), s/p pacemaker placement 8/2009.    Dyslipidemia    Paroxysmal SVT (supraventricular tachycardia)       family history includes Heart attack in his father; Other in his father.     reports that he has never smoked. He has never used smokeless tobacco. He reports current alcohol use. He reports that he does not use drugs.    No Known Allergies      Current Outpatient Medications:     Acetaminophen (ARTHRITIS PAIN RELIEF PO), Take 650 mg by mouth As Needed.,  Disp: , Rfl:     aspirin 81 MG EC tablet, Take 1 tablet by mouth Daily., Disp: 90 tablet, Rfl: 3    Boron 3 MG capsule, Take 3 mg by mouth., Disp: , Rfl:     calcium carbonate (OS-PAEG) 600 MG tablet, Take 1 tablet by mouth Daily., Disp: , Rfl:     cetirizine (zyrTEC) 10 MG tablet, Take 1 tablet by mouth Daily., Disp: , Rfl:     Cranberry 1000 MG capsule, Take 25,000 mg by mouth., Disp: , Rfl:     dilTIAZem CD (CARDIZEM CD) 180 MG 24 hr capsule, Take 1 capsule by mouth Daily., Disp: , Rfl:     Glucosamine-Chondroitin (OSTEO BI-FLEX REGULAR STRENGTH PO), Take  by mouth., Disp: , Rfl:     L-Arginine 1000 MG tablet, Take  by mouth., Disp: , Rfl:     metoprolol succinate XL (TOPROL-XL) 50 MG 24 hr tablet, Take 1 tablet by mouth Daily. (Patient taking differently: Take 1 tablet by mouth Daily. Takes 1/2 of 100mg tab), Disp: 30 tablet, Rfl: 6    Multiple Vitamin (MULTI-VITAMIN DAILY PO), Take  by mouth., Disp: , Rfl:     pravastatin (PRAVACHOL) 40 MG tablet, Take 1 tablet by mouth Daily., Disp: 90 tablet, Rfl: 2    Saw Palmetto 450 MG capsule, Take 450 mg by mouth., Disp: , Rfl:     sildenafil (VIAGRA) 100 MG tablet, Take 1 tablet by mouth As Needed for erectile dysfunction., Disp: 30 tablet, Rfl: 3    terazosin (HYTRIN) 2 MG capsule, Take 1 capsule by mouth Every Night., Disp: , Rfl:     vitamin B-12 (CYANOCOBALAMIN) 500 MCG tablet, Take 1 tablet by mouth Daily., Disp: , Rfl:     vitamin C (ASCORBIC ACID) 250 MG tablet, Take 4 tablets by mouth Daily., Disp: , Rfl:     vitamin D3 125 MCG (5000 UT) capsule capsule, Take 1 capsule by mouth Daily., Disp: , Rfl:     tamsulosin (FLOMAX) 0.4 MG capsule 24 hr capsule, Take 1 capsule by mouth Every Night. (Patient not taking: Reported on 6/12/2023), Disp: , Rfl:       Physical Exam:  I have reviewed the patient's current vital signs as documented in the patient's EMR.   Vitals:    06/12/23 0920   BP: 121/81   Pulse: 65   Resp: 16   SpO2: 98%     Body mass index is 26.09 kg/m².        06/12/23  0920   Weight: 82.5 kg (181 lb 12.8 oz)      Physical Exam  Constitutional:       General: He is not in acute distress.     Appearance: Normal appearance. He is well-developed.   HENT:      Head: Normocephalic and atraumatic.      Mouth/Throat:      Mouth: Mucous membranes are moist.   Eyes:      Extraocular Movements: Extraocular movements intact.      Pupils: Pupils are equal, round, and reactive to light.   Neck:      Vascular: No JVD.   Cardiovascular:      Rate and Rhythm: Normal rate and regular rhythm.      Heart sounds: Normal heart sounds. No murmur heard.    No S3 or S4 sounds.   Pulmonary:      Effort: Pulmonary effort is normal. No respiratory distress.      Breath sounds: Normal breath sounds. No wheezing.   Abdominal:      General: Bowel sounds are normal. There is no distension.      Palpations: Abdomen is soft. There is no hepatomegaly.      Tenderness: There is no abdominal tenderness.   Musculoskeletal:         General: Normal range of motion.      Cervical back: Normal range of motion and neck supple.   Skin:     General: Skin is warm and dry.      Coloration: Skin is not jaundiced or pale.   Neurological:      General: No focal deficit present.      Mental Status: He is alert and oriented to person, place, and time. Mental status is at baseline.   Psychiatric:         Mood and Affect: Mood normal.         Behavior: Behavior normal.         Thought Content: Thought content normal.         Judgment: Judgment normal.         DATA REVIEWED:     TTE/AMISH:  Results for orders placed during the hospital encounter of 02/28/18    Adult Transthoracic Echo Complete W/ Cont if Necessary Per Protocol    Interpretation Summary  · Normal left ventricular cavity size and wall thickness noted. All left ventricular wall segments contract normally.  · Estimated EF appears to be in the range of 56 - 60%.  · The aortic valve is structurally normal. No aortic valve regurgitation is present. No aortic valve  stenosis is present.  · The mitral valve is normal in structure. Mild mitral valve regurgitation is present. No significant mitral valve stenosis is present.  · The tricuspid valve is normal. No tricuspid valve stenosis is present. No tricuspid valve regurgitation is present. Estimated right ventricular systolic pressure from tricuspid regurgitation is normal (<35 mmHg).  · There is no evidence of pericardial effusion.      Laboratory evaluations:    Lab Results   Component Value Date    GLUCOSE 82 02/28/2018    BUN 14 02/28/2018    CREATININE 0.91 02/28/2018    EGFRIFNONA 83 02/28/2018    BCR 15.4 02/28/2018    K 4.3 02/28/2018    CO2 29.1 02/28/2018    CALCIUM 8.9 02/28/2018    ALBUMIN 4.2 09/08/2015    LABIL2 1.4 (L) 09/08/2015    AST 32 09/08/2015    ALT 40 09/08/2015     Lab Results   Component Value Date    WBC 5.0 03/17/2015    HGB 14.1 03/17/2015    HCT 42.4 03/17/2015    MCV 93.4 03/17/2015     03/17/2015     Lab Results   Component Value Date    CHOL 146 06/21/2016    CHLPL 204 (H) 09/08/2015    TRIG 30 06/21/2016    HDL 65 06/21/2016    LDL 75 06/21/2016     No results found for: TSH, W0UQQDR, Z4MMEUE, THYROIDAB  No results found for: HGBA1C  Lab Results   Component Value Date    ALT 40 09/08/2015     No results found for: HGBA1C  Lab Results   Component Value Date    CREATININE 0.91 02/28/2018     No results found for: IRON, TIBC, FERRITIN  Lab Results   Component Value Date    INR 0.96 03/17/2015    PROTIME 10.6 03/17/2015             ECG 12 Lead    Date/Time: 6/12/2023 9:47 AM  Performed by: Cat Escobar APRN  Authorized by: Cat Escobar APRN   Comparison: compared with previous ECG   Rhythm: paced  Rate: normal  Pacing: dual chamber pacing  Clinical impression: non-specific ECG       --------------------------------------------------------------------------------------------------------------------------    ASSESSMENT/PLAN:      Diagnosis Plan   1. SSS (sick sinus  syndrome), s/p pacemaker placement 8/2009.        2. Dyslipidemia            Sick sinus syndrome status post permanent pacemaker implantation  Recent pacemaker interrogation showed normal function with low burden of paroxysmal atrial fibrillation with battery life of 1.17 years.  [facility] with no significant changes.  Continue with Cardizem CD and metoprolol succinate.    2.  Dyslipidemia  [facility] showed LDL at goal.  Continue statin.      This document has been @Electronically signed by DAVID Ford, 06/12/23, 8:41 AM EDT.       Dictated Utilizing Dragon Dictation: Part of this note may be an electronic transcription/translation of spoken language to printed text using the Dragon Dictation System.    Follow-up appointment and medication changes provided in hand delivered After Visit Summary as well as reviewed in the room.

## 2023-07-31 PROCEDURE — 93294 REM INTERROG EVL PM/LDLS PM: CPT | Performed by: INTERNAL MEDICINE

## 2023-07-31 PROCEDURE — 93296 REM INTERROG EVL PM/IDS: CPT | Performed by: INTERNAL MEDICINE

## 2023-10-31 ENCOUNTER — TELEPHONE (OUTPATIENT)
Dept: CARDIOLOGY | Facility: CLINIC | Age: 73
End: 2023-10-31
Payer: MEDICARE

## 2023-10-31 NOTE — TELEPHONE ENCOUNTER
Left a message for the patient to return my call regarding device follow up and remote monitoring.  Wanted to touch base with patient to see where he is getting his device checks and if he wants the remote monitoring transferred there.

## 2023-11-01 ENCOUNTER — OFFICE VISIT (OUTPATIENT)
Dept: CARDIOLOGY | Facility: CLINIC | Age: 73
End: 2023-11-01
Payer: MEDICARE

## 2023-11-01 ENCOUNTER — TELEPHONE (OUTPATIENT)
Dept: CARDIOLOGY | Facility: CLINIC | Age: 73
End: 2023-11-01
Payer: MEDICARE

## 2023-11-01 VITALS
DIASTOLIC BLOOD PRESSURE: 71 MMHG | OXYGEN SATURATION: 97 % | RESPIRATION RATE: 18 BRPM | HEART RATE: 65 BPM | HEIGHT: 70 IN | BODY MASS INDEX: 25.34 KG/M2 | WEIGHT: 177 LBS | SYSTOLIC BLOOD PRESSURE: 109 MMHG

## 2023-11-01 DIAGNOSIS — I47.10 PAROXYSMAL SVT (SUPRAVENTRICULAR TACHYCARDIA): ICD-10-CM

## 2023-11-01 DIAGNOSIS — E78.5 DYSLIPIDEMIA: ICD-10-CM

## 2023-11-01 DIAGNOSIS — I49.5 SSS (SICK SINUS SYNDROME): Primary | ICD-10-CM

## 2023-11-01 PROCEDURE — 99214 OFFICE O/P EST MOD 30 MIN: CPT | Performed by: NURSE PRACTITIONER

## 2023-11-01 PROCEDURE — 1160F RVW MEDS BY RX/DR IN RCRD: CPT | Performed by: NURSE PRACTITIONER

## 2023-11-01 PROCEDURE — 1159F MED LIST DOCD IN RCRD: CPT | Performed by: NURSE PRACTITIONER

## 2023-11-01 NOTE — TELEPHONE ENCOUNTER
Sent fax request for labs.         ----- Message from DAVID Posey sent at 11/1/2023  9:41 AM EDT -----  Please request labs from PCP

## 2023-11-01 NOTE — PROGRESS NOTES
[facility]             [facility] DAVID Escobar Dicky Lhaden, MD  Marvin Mendoza  1950 11/01/2023    Patient Active Problem List   Diagnosis    SSS (sick sinus syndrome), s/p pacemaker placement 8/2009.    Dyslipidemia    Paroxysmal SVT (supraventricular tachycardia)       Dear Tripp Núñez MD:    Subjective     Chief Complaint   Patient presents with    Follow-up     PACER       HPI:     This is a 73 y.o. male with known past medical history of sick sinus syndrome status post permanent dual-chamber pacemaker implantation in 2009 with generator change in March 2015 with a [employer] device, dyslipidemia and paroxysmal SVT.     Marvin Mendoza presents today for routine cardiology follow-up.  Patient states he has been doing overall well since his last visit.  Does report some sharp right-sided chest pain that goes into his right shoulder however it only hurts and not when he lays down and gets a little better after he takes arthritis medicine.  [facility] his [facility]emaker around a week ago and made adjustments to his settings however this information is not available to us.  Blood pressure is well controlled.  Reports relies by PCP which not available for review today.    Diagnostic Testing  Echocardiogram 3/2017: EF 61 to 65%  Echocardiogram 3/2018: EF 56 to 60%.     All other systems were reviewed and were negative.    Patient Active Problem List   Diagnosis    SSS (sick sinus syndrome), s/p pacemaker placement 8/2009.    Dyslipidemia    Paroxysmal SVT (supraventricular tachycardia)       family history includes Heart attack in his father; Other in his father.     reports that he has never smoked. He has never used smokeless tobacco. He reports current alcohol use. He reports that he does not use drugs.    No Known Allergies      Current Outpatient Medications:     Acetaminophen (ARTHRITIS PAIN RELIEF PO),  Take 650 mg by mouth As Needed., Disp: , Rfl:     aspirin 81 MG EC tablet, Take 1 tablet by mouth Daily., Disp: 90 tablet, Rfl: 3    dilTIAZem CD (CARDIZEM CD) 180 MG 24 hr capsule, Take 1 capsule by mouth Daily., Disp: , Rfl:     metoprolol succinate XL (TOPROL-XL) 50 MG 24 hr tablet, Take 1 tablet by mouth Daily. (Patient taking differently: Take 1 tablet by mouth Daily. Takes 1/2 of 100mg tab), Disp: 30 tablet, Rfl: 6    pravastatin (PRAVACHOL) 40 MG tablet, Take 1 tablet by mouth Daily., Disp: 90 tablet, Rfl: 2    sildenafil (VIAGRA) 100 MG tablet, Take 1 tablet by mouth As Needed for erectile dysfunction., Disp: 30 tablet, Rfl: 3    terazosin (HYTRIN) 2 MG capsule, Take 1 capsule by mouth Every Night., Disp: , Rfl:     Boron 3 MG capsule, Take 3 mg by mouth. (Patient not taking: Reported on 11/1/2023), Disp: , Rfl:     calcium carbonate (OS-PAGE) 600 MG tablet, Take 1 tablet by mouth Daily. (Patient not taking: Reported on 11/1/2023), Disp: , Rfl:     cetirizine (zyrTEC) 10 MG tablet, Take 1 tablet by mouth Daily. (Patient not taking: Reported on 11/1/2023), Disp: , Rfl:     Cranberry 1000 MG capsule, Take 25,000 mg by mouth. (Patient not taking: Reported on 11/1/2023), Disp: , Rfl:     Glucosamine-Chondroitin (OSTEO BI-FLEX REGULAR STRENGTH PO), Take  by mouth. (Patient not taking: Reported on 11/1/2023), Disp: , Rfl:     L-Arginine 1000 MG tablet, Take  by mouth. (Patient not taking: Reported on 11/1/2023), Disp: , Rfl:     Multiple Vitamin (MULTI-VITAMIN DAILY PO), Take  by mouth. (Patient not taking: Reported on 11/1/2023), Disp: , Rfl:     Saw Palmetto 450 MG capsule, Take 450 mg by mouth. (Patient not taking: Reported on 11/1/2023), Disp: , Rfl:     tamsulosin (FLOMAX) 0.4 MG capsule 24 hr capsule, Take 1 capsule by mouth Every Night. (Patient not taking: Reported on 6/12/2023), Disp: , Rfl:     vitamin B-12 (CYANOCOBALAMIN) 500 MCG tablet, Take 1 tablet by mouth Daily. (Patient not taking: Reported on  11/1/2023), Disp: , Rfl:     vitamin C (ASCORBIC ACID) 250 MG tablet, Take 4 tablets by mouth Daily. (Patient not taking: Reported on 11/1/2023), Disp: , Rfl:     vitamin D3 125 MCG (5000 UT) capsule capsule, Take 1 capsule by mouth Daily. (Patient not taking: Reported on 11/1/2023), Disp: , Rfl:       Physical Exam:  I have reviewed the patient's current vital signs as documented in the patient's EMR.   Vitals:    11/01/23 0928   BP: 109/71   Pulse: 65   Resp: 18   SpO2: 97%     Body mass index is 25.4 kg/m².       11/01/23 0928   Weight: 80.3 kg (177 lb)      Physical Exam  Constitutional:       General: He is not in acute distress.     Appearance: Normal appearance. He is well-developed.   HENT:      Head: Normocephalic and atraumatic.      Mouth/Throat:      Mouth: Mucous membranes are moist.   Eyes:      Extraocular Movements: Extraocular movements intact.      Pupils: Pupils are equal, round, and reactive to light.   Neck:      Vascular: No JVD.   Cardiovascular:      Rate and Rhythm: Normal rate and regular rhythm.      Heart sounds: Normal heart sounds. No murmur heard.     No S3 or S4 sounds.   Pulmonary:      Effort: Pulmonary effort is normal. No respiratory distress.      Breath sounds: Normal breath sounds. No wheezing.   Abdominal:      General: Bowel sounds are normal. There is no distension.      Palpations: Abdomen is soft. There is no hepatomegaly.      Tenderness: There is no abdominal tenderness.   Musculoskeletal:         General: Normal range of motion.      Cervical back: Normal range of motion and neck supple.   Skin:     General: Skin is warm and dry.      Coloration: Skin is not jaundiced or pale.   Neurological:      General: No focal deficit present.      Mental Status: He is alert and oriented to person, place, and time. Mental status is at baseline.   Psychiatric:         Mood and Affect: Mood normal.         Behavior: Behavior normal.         Thought Content: Thought content normal.    "      Judgment: Judgment normal.            DATA REVIEWED:     TTE/AMISH:  Results for orders placed during the hospital encounter of 02/28/18    Adult Transthoracic Echo Complete W/ Cont if Necessary Per Protocol    Interpretation Summary  · Normal left ventricular cavity size and wall thickness noted. All left ventricular wall segments contract normally.  · Estimated EF appears to be in the range of 56 - 60%.  · The aortic valve is structurally normal. No aortic valve regurgitation is present. No aortic valve stenosis is present.  · The mitral valve is normal in structure. Mild mitral valve regurgitation is present. No significant mitral valve stenosis is present.  · The tricuspid valve is normal. No tricuspid valve stenosis is present. No tricuspid valve regurgitation is present. Estimated right ventricular systolic pressure from tricuspid regurgitation is normal (<35 mmHg).  · There is no evidence of pericardial effusion.      Laboratory evaluations:    Lab Results   Component Value Date    GLUCOSE 82 02/28/2018    BUN 14 02/28/2018    CREATININE 0.91 02/28/2018    EGFRIFNONA 83 02/28/2018    BCR 15.4 02/28/2018    K 4.3 02/28/2018    CO2 29.1 02/28/2018    CALCIUM 8.9 02/28/2018    ALBUMIN 4.2 09/08/2015    LABIL2 1.4 (L) 09/08/2015    AST 32 09/08/2015    ALT 40 09/08/2015     Lab Results   Component Value Date    WBC 5.0 03/17/2015    HGB 14.1 03/17/2015    HCT 42.4 03/17/2015    MCV 93.4 03/17/2015     03/17/2015     Lab Results   Component Value Date    CHOL 146 06/21/2016    CHLPL 204 (H) 09/08/2015    TRIG 30 06/21/2016    HDL 65 06/21/2016    LDL 75 06/21/2016     No results found for: \"TSH\", \"B1QBXCS\", \"K2ZVKIV\", \"THYROIDAB\"  No results found for: \"HGBA1C\"  Lab Results   Component Value Date    ALT 40 09/08/2015     No results found for: \"HGBA1C\"  Lab Results   Component Value Date    CREATININE 0.91 02/28/2018     No results found for: \"IRON\", \"TIBC\", \"FERRITIN\"  Lab Results   Component Value Date "    INR 0.96 03/17/2015    PROTIME 10.6 03/17/2015           --------------------------------------------------------------------------------------------------------------------------    ASSESSMENT/PLAN:      Diagnosis Plan   1. SSS (sick sinus syndrome), s/p pacemaker placement 8/2009.        2. Paroxysmal SVT (supraventricular tachycardia)        3. Dyslipidemia            Sick sinus syndrome status post pacemaker implantation  Patient had remote monitoring which showed some AV maryellen reentry tachycardia that lasted for very short period.  Patient asymptomatic.  [facility] and states it was interrogated around a week ago with adjustments of settings however this information is not available to us.  I did discuss with patient that if he would like to keep his pacemaker interrogations with the [facility] we will send remote monitoring to them as it is difficult to obtain records from the [facility] and in order to treat patient appropriately we need to have pacemaker interrogations available to us.  Patient states he will speak to the [facility] as he would like to switch everything to our office but needs to speak with the [facility] first.  Also noted to have 11 months battery life on remote monitoring.  I also discussed with him about his battery life of 11 months.  I did advise him that we could change this battery or he can let the [facility] do it.    Dyslipidemia  Labs requested from PCP.  Continue statin.    3.  Chest pain  Patient reports some intermittent chest pain.  I did offer echocardiogram and nuclear stress test however he declines at this time and states he will continue to monitor and if it worsens he will consider further testing.      This document has been @Electronically signed by DAVID Ford, 11/01/23, 8:34 AM EDT.       Dictated Utilizing Dragon Dictation: Part of this note may be an electronic transcription/translation of spoken language to printed text using the Dragon  Dictation System.    Follow-up appointment and medication changes provided in hand delivered After Visit Summary as well as reviewed in the room.

## 2023-11-03 ENCOUNTER — TELEPHONE (OUTPATIENT)
Dept: CARDIOLOGY | Facility: CLINIC | Age: 73
End: 2023-11-03

## 2023-11-03 NOTE — TELEPHONE ENCOUNTER
Spoke with patient's wife and [facility] will [facility] [place] in [facility].  They will find out more at that mikayla as to if they will continue to have his device followed in [facility] or at the [facility].  She will update us at that time. I will continue following patient remotely in dr [facility].

## 2023-11-03 NOTE — TELEPHONE ENCOUNTER
Caller: Jenn Denney    Relationship: Emergency Contact    Best call back number: [phone]    What is the best time to reach you: ANYTIME    Who are you requesting to speak with (clinical staff, provider,  specific staff member): CLINICAL    Do you know the name of the person who called: JEREMÍAS MUNOZ    What was the call regarding: PATIENTS WIFE JENN DENNEY, CALLED BACK TO LET TAMMY KNOW THAT THE BLOOD WORK THAT THE [facility] HAS IS OVER A YEAR OLD, BUT PATIENT WILL BE GOING TO HAVE MORE LABS DRAWN ON NOV. 8, 2023.  PATIENT WILL REQUEST LAB RESULTS BE SENT TO [facility].       JUDY HAS ALSO BEEN CALLING ABOUT PATIENT  WANTING TO KNOW IF PATIENT WANTS TO MOVE PACE MAKER TREATMENT TO THE [facility].   PATIENT [facility]ESN'T SEE [facility] UNTIL JANUARY.     Is it okay if the provider responds through [facility]: NO

## 2024-07-25 ENCOUNTER — TELEPHONE (OUTPATIENT)
Dept: CARDIOLOGY | Facility: CLINIC | Age: 74
End: 2024-07-25

## 2024-07-25 ENCOUNTER — TELEPHONE (OUTPATIENT)
Dept: CARDIOLOGY | Facility: CLINIC | Age: 74
End: 2024-07-25
Payer: MEDICARE

## 2024-07-25 NOTE — TELEPHONE ENCOUNTER
Missed remote transmission. Have attempted to call patient. The [phone] number listed for patient is a wrong number. I have called the number and the female who answered the phone reported to me that I had called the wrong number. Double checked the number, called again and was told the same thing. I attempted to reach emergency contact Beatris Check with no success.     Patient had less than 3 months on battery longevity in April 2024 and is 99% . He does not have a upcoming appointment.     Called Chel Gordillo in the office to see if she could assist with getting in touch with the patient. Chel reports patient was switching all of his care to the [facility]. She is going to reach out to the wife to make sure that the [facility] is taking care of patients device.